# Patient Record
Sex: FEMALE | Race: WHITE | NOT HISPANIC OR LATINO | URBAN - METROPOLITAN AREA
[De-identification: names, ages, dates, MRNs, and addresses within clinical notes are randomized per-mention and may not be internally consistent; named-entity substitution may affect disease eponyms.]

---

## 2022-05-05 ENCOUNTER — OFFICE VISIT (OUTPATIENT)
Dept: URGENT CARE | Facility: CLINIC | Age: 62
End: 2022-05-05
Payer: COMMERCIAL

## 2022-05-05 VITALS
TEMPERATURE: 98 F | RESPIRATION RATE: 18 BRPM | SYSTOLIC BLOOD PRESSURE: 137 MMHG | BODY MASS INDEX: 35.08 KG/M2 | WEIGHT: 198 LBS | OXYGEN SATURATION: 100 % | HEIGHT: 63 IN | DIASTOLIC BLOOD PRESSURE: 80 MMHG | HEART RATE: 97 BPM

## 2022-05-05 DIAGNOSIS — M72.2 PLANTAR FASCIAL FIBROMATOSIS OF RIGHT FOOT: Primary | ICD-10-CM

## 2022-05-05 PROCEDURE — 3075F SYST BP GE 130 - 139MM HG: CPT | Mod: CPTII,,, | Performed by: FAMILY MEDICINE

## 2022-05-05 PROCEDURE — 96372 PR INJECTION,THERAP/PROPH/DIAG2ST, IM OR SUBCUT: ICD-10-PCS | Mod: ,,, | Performed by: FAMILY MEDICINE

## 2022-05-05 PROCEDURE — 99203 PR OFFICE/OUTPT VISIT, NEW, LEVL III, 30-44 MIN: ICD-10-PCS | Mod: 25,,, | Performed by: FAMILY MEDICINE

## 2022-05-05 PROCEDURE — 1160F RVW MEDS BY RX/DR IN RCRD: CPT | Mod: CPTII,,, | Performed by: FAMILY MEDICINE

## 2022-05-05 PROCEDURE — 3008F PR BODY MASS INDEX (BMI) DOCUMENTED: ICD-10-PCS | Mod: CPTII,,, | Performed by: FAMILY MEDICINE

## 2022-05-05 PROCEDURE — 3079F PR MOST RECENT DIASTOLIC BLOOD PRESSURE 80-89 MM HG: ICD-10-PCS | Mod: CPTII,,, | Performed by: FAMILY MEDICINE

## 2022-05-05 PROCEDURE — 1159F PR MEDICATION LIST DOCUMENTED IN MEDICAL RECORD: ICD-10-PCS | Mod: CPTII,,, | Performed by: FAMILY MEDICINE

## 2022-05-05 PROCEDURE — 99203 OFFICE O/P NEW LOW 30 MIN: CPT | Mod: 25,,, | Performed by: FAMILY MEDICINE

## 2022-05-05 PROCEDURE — 3008F BODY MASS INDEX DOCD: CPT | Mod: CPTII,,, | Performed by: FAMILY MEDICINE

## 2022-05-05 PROCEDURE — 3079F DIAST BP 80-89 MM HG: CPT | Mod: CPTII,,, | Performed by: FAMILY MEDICINE

## 2022-05-05 PROCEDURE — 3075F PR MOST RECENT SYSTOLIC BLOOD PRESS GE 130-139MM HG: ICD-10-PCS | Mod: CPTII,,, | Performed by: FAMILY MEDICINE

## 2022-05-05 PROCEDURE — 1160F PR REVIEW ALL MEDS BY PRESCRIBER/CLIN PHARMACIST DOCUMENTED: ICD-10-PCS | Mod: CPTII,,, | Performed by: FAMILY MEDICINE

## 2022-05-05 PROCEDURE — 1159F MED LIST DOCD IN RCRD: CPT | Mod: CPTII,,, | Performed by: FAMILY MEDICINE

## 2022-05-05 PROCEDURE — 96372 THER/PROPH/DIAG INJ SC/IM: CPT | Mod: ,,, | Performed by: FAMILY MEDICINE

## 2022-05-05 RX ORDER — DEXAMETHASONE SODIUM PHOSPHATE 100 MG/10ML
10 INJECTION INTRAMUSCULAR; INTRAVENOUS
Status: COMPLETED | OUTPATIENT
Start: 2022-05-05 | End: 2022-05-05

## 2022-05-05 RX ORDER — KETOROLAC TROMETHAMINE 30 MG/ML
30 INJECTION, SOLUTION INTRAMUSCULAR; INTRAVENOUS
Status: COMPLETED | OUTPATIENT
Start: 2022-05-05 | End: 2022-05-05

## 2022-05-05 RX ORDER — PREDNISONE 10 MG/1
30 TABLET ORAL DAILY
Qty: 15 TABLET | Refills: 0 | Status: SHIPPED | OUTPATIENT
Start: 2022-05-05 | End: 2022-05-10

## 2022-05-05 RX ORDER — DICLOFENAC SODIUM 50 MG/1
50 TABLET, DELAYED RELEASE ORAL 2 TIMES DAILY PRN
Qty: 14 TABLET | Refills: 0 | Status: SHIPPED | OUTPATIENT
Start: 2022-05-05 | End: 2022-05-12

## 2022-05-05 RX ORDER — MELATONIN 1 MG/ML
LIQUID (ML) ORAL NIGHTLY
COMMUNITY

## 2022-05-05 RX ADMIN — KETOROLAC TROMETHAMINE 30 MG: 30 INJECTION, SOLUTION INTRAMUSCULAR; INTRAVENOUS at 09:05

## 2022-05-05 RX ADMIN — DEXAMETHASONE SODIUM PHOSPHATE 10 MG: 100 INJECTION INTRAMUSCULAR; INTRAVENOUS at 09:05

## 2022-05-05 NOTE — PATIENT INSTRUCTIONS
continue to extend the affected area 3 to 4 times a day for 10-15 minutes using a frozen water bottle.  Continue stretches.  Medications have been sent to the pharmacy.  He will start all steroids tomorrow morning.  We will start oral anti-inflammatory this evening.  Take the medications with food.  Do not take any Advil or Aleve Motrin or ibuprofen with the medications.  Follow up with Orthopedic as needed

## 2022-05-05 NOTE — PROGRESS NOTES
"Subjective:       Patient ID: Carmen Bryan is a 61 y.o. female.    Vitals:  height is 5' 3" (1.6 m) and weight is 89.8 kg (198 lb). Her temperature is 98.1 °F (36.7 °C). Her blood pressure is 137/80 and her pulse is 97. Her respiration is 18 and oxygen saturation is 100%.     Chief Complaint: foot pain (Rt foot pain, she has had plantar fasciitis)    HP  Rt heel pain, 2 mos, uses ice, stretching, helps some,  she states she has long history of plantar fasciitis issues due to high arches.  Since she does have an orthopedic doctor.  Has done injections into her foot .  States she had in the left and now it is the right that is bothering her.  Denies any trauma injury or fall.                 Constitution: Negative.   HENT: Negative.    Cardiovascular: Negative.    Eyes: Negative.    Respiratory: Negative.    Gastrointestinal: Negative.    Genitourinary: Negative.    Musculoskeletal: Positive for pain.   Skin: Negative.    Allergic/Immunologic: Negative.    Hematologic/Lymphatic: Negative.        Objective:      Physical Exam   Constitutional: She is oriented to person, place, and time. She appears well-developed. She is cooperative.  Non-toxic appearance. She does not appear ill. No distress.   HENT:   Head: Normocephalic and atraumatic.   Ears:   Right Ear: Hearing and external ear normal.   Left Ear: Hearing and external ear normal.   Nose: Nose normal. No mucosal edema, rhinorrhea or nasal deformity. No epistaxis. Right sinus exhibits no maxillary sinus tenderness and no frontal sinus tenderness. Left sinus exhibits no maxillary sinus tenderness and no frontal sinus tenderness.   Mouth/Throat: Uvula is midline, oropharynx is clear and moist and mucous membranes are normal. No trismus in the jaw. Normal dentition. No uvula swelling. No oropharyngeal exudate, posterior oropharyngeal edema or posterior oropharyngeal erythema.   Eyes: Conjunctivae and lids are normal. No scleral icterus.   Neck: Trachea normal and " "phonation normal. Neck supple. No edema present. No erythema present. No neck rigidity present.   Pulmonary/Chest: Effort normal. No respiratory distress. She has no decreased breath sounds. She has no rhonchi.   Abdominal: Normal appearance.   Musculoskeletal: Normal range of motion.         General: Tenderness (Through out the arch of the right foot) present. No deformity. Normal range of motion.   Neurological: She is alert and oriented to person, place, and time. She exhibits normal muscle tone. Coordination normal.   Skin: Skin is warm, dry, intact, not diaphoretic and not pale.   Psychiatric: Her speech is normal and behavior is normal. Judgment and thought content normal.   Nursing note and vitals reviewed.             Previous History      Review of patient's allergies indicates:  No Known Allergies    History reviewed. No pertinent past medical history.  Current Outpatient Medications   Medication Instructions    diclofenac (VOLTAREN) 50 mg, Oral, 2 times daily PRN    melatonin oral solution Oral, Nightly    predniSONE (DELTASONE) 30 mg, Oral, Daily     Past Surgical History:   Procedure Laterality Date    CHOLECYSTECTOMY       Family History   Problem Relation Age of Onset    Hypertension Mother     Kidney disease Mother     Stroke Father     Cancer Father     Diabetes Father     Hypertension Father        Social History     Tobacco Use    Smoking status: Never Smoker    Smokeless tobacco: Never Used   Substance Use Topics    Alcohol use: Yes     Alcohol/week: 3.0 standard drinks     Types: 3 Cans of beer per week     Comment: occasionally        Physical Exam      Vital Signs Reviewed   /80   Pulse 97   Temp 98.1 °F (36.7 °C)   Resp 18   Ht 5' 3" (1.6 m)   Wt 89.8 kg (198 lb)   SpO2 100%   BMI 35.07 kg/m²        Procedures    Procedures     Labs   No results found for this or any previous visit.  Assessment:       1. Plantar fascial fibromatosis of right foot          Plan:     " continue to extend the affected area 3 to 4 times a day for 10-15 minutes using a frozen water bottle.  Continue stretches.  Medications have been sent to the pharmacy.  He will start all steroids tomorrow morning.  We will start oral anti-inflammatory this evening.  Take the medications with food.  Do not take any Advil or Aleve Motrin or ibuprofen with the medications.  Follow up with Orthopedic as needed    Plantar fascial fibromatosis of right foot    Other orders  -     ketorolac injection 30 mg  -     dexamethasone injection 10 mg  -     diclofenac (VOLTAREN) 50 MG EC tablet; Take 1 tablet (50 mg total) by mouth 2 (two) times daily as needed (pain).  Dispense: 14 tablet; Refill: 0  -     predniSONE (DELTASONE) 10 MG tablet; Take 3 tablets (30 mg total) by mouth once daily. for 5 days  Dispense: 15 tablet; Refill: 0

## 2022-05-05 NOTE — PROGRESS NOTES
"Subjective:       Patient ID: Carmen Bryan is a 61 y.o. female.    Vitals:  height is 5' 3" (1.6 m) and weight is 89.8 kg (198 lb). Her temperature is 98.1 °F (36.7 °C). Her blood pressure is 137/80 and her pulse is 97. Her respiration is 18 and oxygen saturation is 100%.     Chief Complaint: foot pain (Rt foot pain, she has had plantar fasciitis)    HPI  ROS    Objective:      Physical Exam      Assessment:       No diagnosis found.      Plan:         There are no diagnoses linked to this encounter.               "

## 2022-05-05 NOTE — PROGRESS NOTES
"Subjective:       Patient ID: Carmen Bryan is a 61 y.o. female.    Vitals:  height is 5' 3" (1.6 m) and weight is 89.8 kg (198 lb). Her temperature is 98.1 °F (36.7 °C). Her blood pressure is 137/80 and her pulse is 97. Her respiration is 18 and oxygen saturation is 100%.     Chief Complaint: foot pain (Rt foot pain, she has had plantar fasciitis)    Leg Pain       ROS    Objective:      Physical Exam      Assessment:       1. Plantar fascial fibromatosis of right foot          Plan:         Plantar fascial fibromatosis of right foot    Other orders  -     ketorolac injection 30 mg  -     dexamethasone injection 10 mg  -     diclofenac (VOLTAREN) 50 MG EC tablet; Take 1 tablet (50 mg total) by mouth 2 (two) times daily as needed (pain).  Dispense: 14 tablet; Refill: 0  -     predniSONE (DELTASONE) 10 MG tablet; Take 3 tablets (30 mg total) by mouth once daily. for 5 days  Dispense: 15 tablet; Refill: 0                   "

## 2022-07-08 ENCOUNTER — HOSPITAL ENCOUNTER (INPATIENT)
Facility: HOSPITAL | Age: 62
LOS: 4 days | Discharge: HOME | DRG: 378 | End: 2022-07-12
Attending: EMERGENCY MEDICINE | Admitting: INTERNAL MEDICINE
Payer: COMMERCIAL

## 2022-07-08 DIAGNOSIS — D64.9 SYMPTOMATIC ANEMIA: ICD-10-CM

## 2022-07-08 DIAGNOSIS — R07.9 CHEST PAIN: Primary | ICD-10-CM

## 2022-07-08 DIAGNOSIS — R06.09 EXERTIONAL DYSPNEA: ICD-10-CM

## 2022-07-08 DIAGNOSIS — K92.2 ACUTE GI BLEEDING: ICD-10-CM

## 2022-07-08 DIAGNOSIS — R07.9 ACUTE CHEST PAIN: ICD-10-CM

## 2022-07-08 DIAGNOSIS — D16.02 ENCHONDROMA OF HUMERUS, LEFT: ICD-10-CM

## 2022-07-08 DIAGNOSIS — R06.00 DYSPNEA, UNSPECIFIED TYPE: ICD-10-CM

## 2022-07-08 LAB
ABORH RETYPE: NORMAL
ALBUMIN SERPL-MCNC: 4.1 GM/DL (ref 3.5–5)
ALBUMIN/GLOB SERPL: 1.5 RATIO (ref 1.1–2)
ALP SERPL-CCNC: 69 UNIT/L (ref 40–150)
ALT SERPL-CCNC: 10 UNIT/L (ref 0–55)
AST SERPL-CCNC: 18 UNIT/L (ref 5–34)
BASOPHILS # BLD AUTO: 0.1 X10(3)/MCL (ref 0–0.2)
BASOPHILS NFR BLD AUTO: 0.8 %
BILIRUBIN DIRECT+TOT PNL SERPL-MCNC: 0.5 MG/DL
BNP BLD-MCNC: 61.4 PG/ML
BUN SERPL-MCNC: 13.9 MG/DL (ref 9.8–20.1)
CALCIUM SERPL-MCNC: 9.3 MG/DL (ref 8.4–10.2)
CHLORIDE SERPL-SCNC: 111 MMOL/L (ref 98–107)
CO2 SERPL-SCNC: 19 MMOL/L (ref 22–29)
CREAT SERPL-MCNC: 0.75 MG/DL (ref 0.55–1.02)
EOSINOPHIL # BLD AUTO: 0.13 X10(3)/MCL (ref 0–0.9)
EOSINOPHIL NFR BLD AUTO: 1 %
ERYTHROCYTE [DISTWIDTH] IN BLOOD BY AUTOMATED COUNT: 17.6 % (ref 11.5–17)
GLOBULIN SER-MCNC: 2.7 GM/DL (ref 2.4–3.5)
GLUCOSE SERPL-MCNC: 111 MG/DL (ref 74–100)
GROUP & RH: NORMAL
HCT VFR BLD AUTO: 24.2 % (ref 37–47)
HGB BLD-MCNC: 6.4 GM/DL (ref 12–16)
IMM GRANULOCYTES # BLD AUTO: 0.14 X10(3)/MCL (ref 0–0.04)
IMM GRANULOCYTES NFR BLD AUTO: 1.1 %
INDIRECT COOMBS GEL: NORMAL
LYMPHOCYTES # BLD AUTO: 1.66 X10(3)/MCL (ref 0.6–4.6)
LYMPHOCYTES NFR BLD AUTO: 13.4 %
MCH RBC QN AUTO: 17.9 PG (ref 27–31)
MCHC RBC AUTO-ENTMCNC: 26.4 MG/DL (ref 33–36)
MCV RBC AUTO: 67.8 FL (ref 80–94)
MONOCYTES # BLD AUTO: 1.4 X10(3)/MCL (ref 0.1–1.3)
MONOCYTES NFR BLD AUTO: 11.3 %
NEUTROPHILS # BLD AUTO: 9 X10(3)/MCL (ref 2.1–9.2)
NEUTROPHILS NFR BLD AUTO: 72.4 %
NRBC BLD AUTO-RTO: 0.6 %
PLATELET # BLD AUTO: 430 X10(3)/MCL (ref 130–400)
PMV BLD AUTO: 9.6 FL (ref 7.4–10.4)
POTASSIUM SERPL-SCNC: 4.3 MMOL/L (ref 3.5–5.1)
PROT SERPL-MCNC: 6.8 GM/DL (ref 6.4–8.3)
RBC # BLD AUTO: 3.57 X10(6)/MCL (ref 4.2–5.4)
SODIUM SERPL-SCNC: 139 MMOL/L (ref 136–145)
TROPONIN I SERPL-MCNC: <0.01 NG/ML (ref 0–0.04)
WBC # SPEC AUTO: 12.4 X10(3)/MCL (ref 4.5–11.5)

## 2022-07-08 PROCEDURE — 84484 ASSAY OF TROPONIN QUANT: CPT | Performed by: NURSE PRACTITIONER

## 2022-07-08 PROCEDURE — 96376 TX/PRO/DX INJ SAME DRUG ADON: CPT

## 2022-07-08 PROCEDURE — 99291 CRITICAL CARE FIRST HOUR: CPT | Mod: 25

## 2022-07-08 PROCEDURE — 36415 COLL VENOUS BLD VENIPUNCTURE: CPT | Performed by: EMERGENCY MEDICINE

## 2022-07-08 PROCEDURE — 85025 COMPLETE CBC W/AUTO DIFF WBC: CPT | Performed by: NURSE PRACTITIONER

## 2022-07-08 PROCEDURE — 93010 ELECTROCARDIOGRAM REPORT: CPT | Mod: ,,, | Performed by: INTERNAL MEDICINE

## 2022-07-08 PROCEDURE — 86920 COMPATIBILITY TEST SPIN: CPT | Performed by: EMERGENCY MEDICINE

## 2022-07-08 PROCEDURE — P9016 RBC LEUKOCYTES REDUCED: HCPCS | Performed by: EMERGENCY MEDICINE

## 2022-07-08 PROCEDURE — 86920 COMPATIBILITY TEST SPIN: CPT | Performed by: INTERNAL MEDICINE

## 2022-07-08 PROCEDURE — C9113 INJ PANTOPRAZOLE SODIUM, VIA: HCPCS | Performed by: EMERGENCY MEDICINE

## 2022-07-08 PROCEDURE — 83880 ASSAY OF NATRIURETIC PEPTIDE: CPT | Performed by: NURSE PRACTITIONER

## 2022-07-08 PROCEDURE — 36415 COLL VENOUS BLD VENIPUNCTURE: CPT | Performed by: NURSE PRACTITIONER

## 2022-07-08 PROCEDURE — 36430 TRANSFUSION BLD/BLD COMPNT: CPT

## 2022-07-08 PROCEDURE — 86850 RBC ANTIBODY SCREEN: CPT | Performed by: EMERGENCY MEDICINE

## 2022-07-08 PROCEDURE — 80053 COMPREHEN METABOLIC PANEL: CPT | Performed by: NURSE PRACTITIONER

## 2022-07-08 PROCEDURE — 93010 EKG 12-LEAD: ICD-10-PCS | Mod: ,,, | Performed by: INTERNAL MEDICINE

## 2022-07-08 PROCEDURE — C9113 INJ PANTOPRAZOLE SODIUM, VIA: HCPCS | Performed by: NURSE PRACTITIONER

## 2022-07-08 PROCEDURE — 93005 ELECTROCARDIOGRAM TRACING: CPT

## 2022-07-08 PROCEDURE — 25000003 PHARM REV CODE 250: Performed by: NURSE PRACTITIONER

## 2022-07-08 PROCEDURE — 11000001 HC ACUTE MED/SURG PRIVATE ROOM

## 2022-07-08 PROCEDURE — 96374 THER/PROPH/DIAG INJ IV PUSH: CPT

## 2022-07-08 PROCEDURE — 63600175 PHARM REV CODE 636 W HCPCS: Performed by: EMERGENCY MEDICINE

## 2022-07-08 PROCEDURE — 63600175 PHARM REV CODE 636 W HCPCS: Performed by: NURSE PRACTITIONER

## 2022-07-08 RX ORDER — PANTOPRAZOLE SODIUM 40 MG/10ML
80 INJECTION, POWDER, LYOPHILIZED, FOR SOLUTION INTRAVENOUS
Status: COMPLETED | OUTPATIENT
Start: 2022-07-08 | End: 2022-07-08

## 2022-07-08 RX ORDER — NALOXONE HCL 0.4 MG/ML
0.02 VIAL (ML) INJECTION
Status: DISCONTINUED | OUTPATIENT
Start: 2022-07-08 | End: 2022-07-12 | Stop reason: HOSPADM

## 2022-07-08 RX ORDER — HYDROCODONE BITARTRATE AND ACETAMINOPHEN 500; 5 MG/1; MG/1
TABLET ORAL
Status: DISCONTINUED | OUTPATIENT
Start: 2022-07-08 | End: 2022-07-12 | Stop reason: HOSPADM

## 2022-07-08 RX ORDER — SODIUM CHLORIDE 9 MG/ML
INJECTION, SOLUTION INTRAVENOUS CONTINUOUS
Status: DISCONTINUED | OUTPATIENT
Start: 2022-07-08 | End: 2022-07-12

## 2022-07-08 RX ORDER — SODIUM CHLORIDE 0.9 % (FLUSH) 0.9 %
10 SYRINGE (ML) INJECTION EVERY 12 HOURS PRN
Status: DISCONTINUED | OUTPATIENT
Start: 2022-07-08 | End: 2022-07-12 | Stop reason: HOSPADM

## 2022-07-08 RX ADMIN — SODIUM CHLORIDE: 9 INJECTION, SOLUTION INTRAVENOUS at 11:07

## 2022-07-08 RX ADMIN — PANTOPRAZOLE SODIUM 8 MG/HR: 40 INJECTION, POWDER, FOR SOLUTION INTRAVENOUS at 11:07

## 2022-07-08 RX ADMIN — PANTOPRAZOLE SODIUM 80 MG: 40 INJECTION, POWDER, FOR SOLUTION INTRAVENOUS at 06:07

## 2022-07-08 NOTE — ED PROVIDER NOTES
Encounter Date: 7/8/2022       History     Chief Complaint   Patient presents with    Chest Pain    Shortness of Breath     Pt presents c/o Chest tightness with SOB. Onset today.  Denies cardiac hx.  States increased daily stress.  Neg smoker.      Patient presents to the ED chest tightness with ambulation.   .No distress noted in triage.         Review of patient's allergies indicates:  No Known Allergies  No past medical history on file.  Past Surgical History:   Procedure Laterality Date    CHOLECYSTECTOMY       Family History   Problem Relation Age of Onset    Hypertension Mother     Kidney disease Mother     Stroke Father     Cancer Father     Diabetes Father     Hypertension Father      Social History     Tobacco Use    Smoking status: Never Smoker    Smokeless tobacco: Never Used   Substance Use Topics    Alcohol use: Yes     Alcohol/week: 3.0 standard drinks     Types: 3 Cans of beer per week     Comment: occasionally     Review of Systems   Constitutional: Negative.    Respiratory: Positive for chest tightness.    All other systems reviewed and are negative.      Physical Exam     Initial Vitals [07/08/22 1051]   BP Pulse Resp Temp SpO2   (!) 143/80 87 16 98.6 °F (37 °C) 98 %      MAP       --         Physical Exam    Constitutional: She appears well-developed.   HENT:   Head: Normocephalic.           ED Course   Procedures  Labs Reviewed - No data to display       Imaging Results    None          Medications - No data to display                                 Candace Abdul, [facility]  07/08/22 8431

## 2022-07-09 LAB
ABO + RH BLD: NORMAL
ABO + RH BLD: NORMAL
ALBUMIN SERPL-MCNC: 3.6 GM/DL (ref 3.5–5)
ALBUMIN/GLOB SERPL: 1.6 RATIO (ref 1.1–2)
ALP SERPL-CCNC: 64 UNIT/L (ref 40–150)
ALT SERPL-CCNC: 9 UNIT/L (ref 0–55)
AST SERPL-CCNC: 15 UNIT/L (ref 5–34)
BASOPHILS # BLD AUTO: 0.08 X10(3)/MCL (ref 0–0.2)
BASOPHILS NFR BLD AUTO: 0.8 %
BILIRUBIN DIRECT+TOT PNL SERPL-MCNC: 1 MG/DL
BLD PROD TYP BPU: NORMAL
BLD PROD TYP BPU: NORMAL
BLOOD UNIT EXPIRATION DATE: NORMAL
BLOOD UNIT EXPIRATION DATE: NORMAL
BLOOD UNIT TYPE CODE: 5100
BLOOD UNIT TYPE CODE: 5100
BUN SERPL-MCNC: 9.9 MG/DL (ref 9.8–20.1)
CALCIUM SERPL-MCNC: 8.8 MG/DL (ref 8.4–10.2)
CHLORIDE SERPL-SCNC: 113 MMOL/L (ref 98–107)
CO2 SERPL-SCNC: 19 MMOL/L (ref 22–29)
CREAT SERPL-MCNC: 0.71 MG/DL (ref 0.55–1.02)
CROSSMATCH INTERPRETATION: NORMAL
CROSSMATCH INTERPRETATION: NORMAL
DISPENSE STATUS: NORMAL
DISPENSE STATUS: NORMAL
EOSINOPHIL # BLD AUTO: 0.08 X10(3)/MCL (ref 0–0.9)
EOSINOPHIL NFR BLD AUTO: 0.8 %
ERYTHROCYTE [DISTWIDTH] IN BLOOD BY AUTOMATED COUNT: 21.5 % (ref 11.5–17)
GLOBULIN SER-MCNC: 2.3 GM/DL (ref 2.4–3.5)
GLUCOSE SERPL-MCNC: 101 MG/DL (ref 74–100)
HCT VFR BLD AUTO: 30 % (ref 37–47)
HGB BLD-MCNC: 8.5 GM/DL (ref 12–16)
IMM GRANULOCYTES # BLD AUTO: 0.13 X10(3)/MCL (ref 0–0.04)
IMM GRANULOCYTES NFR BLD AUTO: 1.3 %
LYMPHOCYTES # BLD AUTO: 0.8 X10(3)/MCL (ref 0.6–4.6)
LYMPHOCYTES NFR BLD AUTO: 7.8 %
MCH RBC QN AUTO: 21 PG (ref 27–31)
MCHC RBC AUTO-ENTMCNC: 28.3 MG/DL (ref 33–36)
MCV RBC AUTO: 74.1 FL (ref 80–94)
MONOCYTES # BLD AUTO: 1.31 X10(3)/MCL (ref 0.1–1.3)
MONOCYTES NFR BLD AUTO: 12.8 %
NEUTROPHILS # BLD AUTO: 7.8 X10(3)/MCL (ref 2.1–9.2)
NEUTROPHILS NFR BLD AUTO: 76.5 %
NRBC BLD AUTO-RTO: 0.4 %
PLATELET # BLD AUTO: 306 X10(3)/MCL (ref 130–400)
PMV BLD AUTO: 9.4 FL (ref 7.4–10.4)
POTASSIUM SERPL-SCNC: 4.3 MMOL/L (ref 3.5–5.1)
PROT SERPL-MCNC: 5.9 GM/DL (ref 6.4–8.3)
RBC # BLD AUTO: 4.05 X10(6)/MCL (ref 4.2–5.4)
SODIUM SERPL-SCNC: 141 MMOL/L (ref 136–145)
UNIT NUMBER: NORMAL
UNIT NUMBER: NORMAL
WBC # SPEC AUTO: 10.2 X10(3)/MCL (ref 4.5–11.5)

## 2022-07-09 PROCEDURE — 25000003 PHARM REV CODE 250: Performed by: NURSE PRACTITIONER

## 2022-07-09 PROCEDURE — 80053 COMPREHEN METABOLIC PANEL: CPT | Performed by: NURSE PRACTITIONER

## 2022-07-09 PROCEDURE — 85025 COMPLETE CBC W/AUTO DIFF WBC: CPT | Performed by: NURSE PRACTITIONER

## 2022-07-09 PROCEDURE — C9113 INJ PANTOPRAZOLE SODIUM, VIA: HCPCS | Performed by: NURSE PRACTITIONER

## 2022-07-09 PROCEDURE — 63600175 PHARM REV CODE 636 W HCPCS: Performed by: NURSE PRACTITIONER

## 2022-07-09 PROCEDURE — 36415 COLL VENOUS BLD VENIPUNCTURE: CPT | Performed by: NURSE PRACTITIONER

## 2022-07-09 PROCEDURE — 11000001 HC ACUTE MED/SURG PRIVATE ROOM

## 2022-07-09 PROCEDURE — 25000003 PHARM REV CODE 250: Performed by: INTERNAL MEDICINE

## 2022-07-09 PROCEDURE — P9016 RBC LEUKOCYTES REDUCED: HCPCS | Performed by: EMERGENCY MEDICINE

## 2022-07-09 RX ORDER — DIPHENHYDRAMINE HCL 25 MG
25 CAPSULE ORAL EVERY 6 HOURS PRN
Status: DISCONTINUED | OUTPATIENT
Start: 2022-07-09 | End: 2022-07-12 | Stop reason: HOSPADM

## 2022-07-09 RX ORDER — ACETAMINOPHEN 325 MG/1
650 TABLET ORAL EVERY 6 HOURS PRN
Status: DISCONTINUED | OUTPATIENT
Start: 2022-07-09 | End: 2022-07-12 | Stop reason: HOSPADM

## 2022-07-09 RX ORDER — ONDANSETRON 2 MG/ML
4 INJECTION INTRAMUSCULAR; INTRAVENOUS EVERY 4 HOURS PRN
Status: DISCONTINUED | OUTPATIENT
Start: 2022-07-09 | End: 2022-07-12 | Stop reason: HOSPADM

## 2022-07-09 RX ORDER — TALC
3 POWDER (GRAM) TOPICAL NIGHTLY PRN
Status: DISCONTINUED | OUTPATIENT
Start: 2022-07-09 | End: 2022-07-12 | Stop reason: HOSPADM

## 2022-07-09 RX ORDER — CETIRIZINE HYDROCHLORIDE 10 MG/1
10 TABLET ORAL DAILY
Status: DISCONTINUED | OUTPATIENT
Start: 2022-07-09 | End: 2022-07-12 | Stop reason: HOSPADM

## 2022-07-09 RX ADMIN — CETIRIZINE HYDROCHLORIDE 10 MG: 10 TABLET, FILM COATED ORAL at 11:07

## 2022-07-09 RX ADMIN — SODIUM CHLORIDE: 9 INJECTION, SOLUTION INTRAVENOUS at 11:07

## 2022-07-09 RX ADMIN — PANTOPRAZOLE SODIUM 8 MG/HR: 40 INJECTION, POWDER, FOR SOLUTION INTRAVENOUS at 07:07

## 2022-07-09 RX ADMIN — PANTOPRAZOLE SODIUM 8 MG/HR: 40 INJECTION, POWDER, FOR SOLUTION INTRAVENOUS at 11:07

## 2022-07-09 NOTE — CONSULTS
Gastroenterology Consultation Note    Reason for Consult:  The primary encounter diagnosis was Chest pain. Diagnoses of Enchondroma of humerus, left, Acute chest pain, Dyspnea, unspecified type, Symptomatic anemia, and Acute GI bleeding were also pertinent to this visit.    PCP:   Leonardo Trevizo MD    Referring MD:  Aaareferral Self  No address on file    Hospital Day: 1     Initial History of Present Illness (HPI):  This is a 61 y.o. female current knee with symptomatic anemia, question GI bleeding/melena.    She reports being under a moderate amount of stress with her  currently admitted hospital for removal of meningioma last week.  She also admits to moderate NSAID use and reports intermittent episodes of dark stool.  She was seen in the emergency room yesterday where hemoglobin was 6.4 MCV 67, transfused 2 units of packed red blood cells with hemoglobin rise to 8.5.    This morning, in no acute distress at bedside without further GI bleeding.    ROS:  Review of Systems   Constitutional: Positive for malaise/fatigue. Negative for fever and weight loss.   Respiratory: Negative for cough and shortness of breath.    Cardiovascular: Negative for chest pain, palpitations and leg swelling.   Gastrointestinal: Positive for blood in stool and melena. Negative for abdominal pain, constipation, diarrhea, heartburn, nausea and vomiting.   Musculoskeletal: Negative for back pain and myalgias.   Skin: Negative for rash.   Neurological: Negative for speech change and focal weakness.   All other systems reviewed and are negative.      Medical History:   No past medical history on file.    Surgical History:   Past Surgical History:   Procedure Laterality Date    CHOLECYSTECTOMY         Family History:   Family History   Problem Relation Age of Onset    Hypertension Mother     Kidney disease Mother     Stroke Father     Cancer Father     Diabetes Father     Hypertension Father    .     Social History:   Social  History     Tobacco Use    Smoking status: Never Smoker    Smokeless tobacco: Never Used   Substance Use Topics    Alcohol use: Yes     Alcohol/week: 3.0 standard drinks     Types: 3 Cans of beer per week     Comment: occasionally       Allergies:  Review of patient's allergies indicates:  No Known Allergies    Medications Prior to Admission   Medication Sig Dispense Refill Last Dose    melatonin oral solution Take by mouth every evening.            Objective Findings:    Vital Signs:  /85   Pulse 92   Temp 98.6 °F (37 °C) (Oral)   Resp 17   Wt 93.3 kg (205 lb 11 oz)   SpO2 (!) 94%   BMI 36.44 kg/m²   Body mass index is 36.44 kg/m².    Physical Exam:  Physical Exam  Cardiovascular:      Rate and Rhythm: Normal rate and regular rhythm.   Pulmonary:      Effort: Pulmonary effort is normal.      Breath sounds: Normal breath sounds.   Abdominal:      General: Abdomen is flat. Bowel sounds are normal.      Palpations: Abdomen is soft.   Neurological:      Mental Status: She is alert.         Labs:  Recent Results (from the past 48 hour(s))   CBC with Differential    Collection Time: 07/08/22  1:53 PM   Result Value Ref Range    WBC 12.4 (H) 4.5 - 11.5 x10(3)/mcL    RBC 3.57 (L) 4.20 - 5.40 x10(6)/mcL    Hgb 6.4 (L) 12.0 - 16.0 gm/dL    Hct 24.2 (L) 37.0 - 47.0 %    MCV 67.8 (L) 80.0 - 94.0 fL    MCH 17.9 (L) 27.0 - 31.0 pg    MCHC 26.4 (L) 33.0 - 36.0 mg/dL    RDW 17.6 (H) 11.5 - 17.0 %    Platelet 430 (H) 130 - 400 x10(3)/mcL    MPV 9.6 7.4 - 10.4 fL    Neut % 72.4 %    Lymph % 13.4 %    Mono % 11.3 %    Eos % 1.0 %    Basophil % 0.8 %    Lymph # 1.66 0.6 - 4.6 x10(3)/mcL    Neut # 9.0 2.1 - 9.2 x10(3)/mcL    Mono # 1.40 (H) 0.1 - 1.3 x10(3)/mcL    Eos # 0.13 0 - 0.9 x10(3)/mcL    Baso # 0.10 0 - 0.2 x10(3)/mcL    IG# 0.14 (H) 0 - 0.04 x10(3)/mcL    IG% 1.1 %    NRBC% 0.6 %   Comprehensive Metabolic Panel    Collection Time: 07/08/22  1:53 PM   Result Value Ref Range    Sodium Level 139 136 - 145 mmol/L     Potassium Level 4.3 3.5 - 5.1 mmol/L    Chloride 111 (H) 98 - 107 mmol/L    Carbon Dioxide 19 (L) 22 - 29 mmol/L    Glucose Level 111 (H) 74 - 100 mg/dL    Blood Urea Nitrogen 13.9 9.8 - 20.1 mg/dL    Creatinine 0.75 0.55 - 1.02 mg/dL    Calcium Level Total 9.3 8.4 - 10.2 mg/dL    Protein Total 6.8 6.4 - 8.3 gm/dL    Albumin Level 4.1 3.5 - 5.0 gm/dL    Globulin 2.7 2.4 - 3.5 gm/dL    Albumin/Globulin Ratio 1.5 1.1 - 2.0 ratio    Bilirubin Total 0.5 <=1.5 mg/dL    Alkaline Phosphatase 69 40 - 150 unit/L    Alanine Aminotransferase 10 0 - 55 unit/L    Aspartate Aminotransferase 18 5 - 34 unit/L    Estimated GFR-Non  >60 mls/min/1.73/m2   Troponin I    Collection Time: 07/08/22  1:53 PM   Result Value Ref Range    Troponin-I <0.010 0.000 - 0.045 ng/mL   BNP    Collection Time: 07/08/22  1:53 PM   Result Value Ref Range    Natriuretic Peptide 61.4 <=100.0 pg/mL   Prepare RBC 2 Units; anemia    Collection Time: 07/08/22  6:41 PM   Result Value Ref Range    UNIT NUMBER [id number]     UNIT ABO/RH O POS     DISPENSE STATUS Transfused     Unit Expiration [id number]     Product Code L2871H42     Unit Blood Type Code 5100     CROSSMATCH INTERPRETATION Compatible     UNIT NUMBER [id number]     UNIT ABO/RH O POS     DISPENSE STATUS Issued     Unit Expiration [id number]     Product Code D4820G68     Unit Blood Type Code 5100     CROSSMATCH INTERPRETATION Compatible    Type & Screen    Collection Time: 07/08/22  6:41 PM   Result Value Ref Range    Group & Rh O POS     Indirect Anita GEL NEG    ABORH RETYPE    Collection Time: 07/08/22  8:23 PM   Result Value Ref Range    ABORH Retype O POS    Comprehensive Metabolic Panel (CMP)    Collection Time: 07/09/22 10:26 AM   Result Value Ref Range    Sodium Level 141 136 - 145 mmol/L    Potassium Level 4.3 3.5 - 5.1 mmol/L    Chloride 113 (H) 98 - 107 mmol/L    Carbon Dioxide 19 (L) 22 - 29 mmol/L    Glucose Level 101 (H) 74 - 100 mg/dL    Blood Urea  Nitrogen 9.9 9.8 - 20.1 mg/dL    Creatinine 0.71 0.55 - 1.02 mg/dL    Calcium Level Total 8.8 8.4 - 10.2 mg/dL    Protein Total 5.9 (L) 6.4 - 8.3 gm/dL    Albumin Level 3.6 3.5 - 5.0 gm/dL    Globulin 2.3 (L) 2.4 - 3.5 gm/dL    Albumin/Globulin Ratio 1.6 1.1 - 2.0 ratio    Bilirubin Total 1.0 <=1.5 mg/dL    Alkaline Phosphatase 64 40 - 150 unit/L    Alanine Aminotransferase 9 0 - 55 unit/L    Aspartate Aminotransferase 15 5 - 34 unit/L    Estimated GFR-Non  >60 mls/min/1.73/m2   CBC with Differential    Collection Time: 07/09/22 10:26 AM   Result Value Ref Range    WBC 10.2 4.5 - 11.5 x10(3)/mcL    RBC 4.05 (L) 4.20 - 5.40 x10(6)/mcL    Hgb 8.5 (L) 12.0 - 16.0 gm/dL    Hct 30.0 (L) 37.0 - 47.0 %    MCV 74.1 (L) 80.0 - 94.0 fL    MCH 21.0 (L) 27.0 - 31.0 pg    MCHC 28.3 (L) 33.0 - 36.0 mg/dL    RDW 21.5 (H) 11.5 - 17.0 %    Platelet 306 130 - 400 x10(3)/mcL    MPV 9.4 7.4 - 10.4 fL    Neut % 76.5 %    Lymph % 7.8 %    Mono % 12.8 %    Eos % 0.8 %    Basophil % 0.8 %    Lymph # 0.80 0.6 - 4.6 x10(3)/mcL    Neut # 7.8 2.1 - 9.2 x10(3)/mcL    Mono # 1.31 (H) 0.1 - 1.3 x10(3)/mcL    Eos # 0.08 0 - 0.9 x10(3)/mcL    Baso # 0.08 0 - 0.2 x10(3)/mcL    IG# 0.13 (H) 0 - 0.04 x10(3)/mcL    IG% 1.3 %    NRBC% 0.4 %       X-Ray Chest 1 View   Final Result         1. No convincing acute radiographic abnormality.   2. Incidental large hiatal hernia.   3. Incidental right humeral head finding is of typical radiographic appearance for enchondroma.  Correlation with evidence of pain localization recommended; repeat dedicated shoulder radiographs could be obtained in 2-3 months in order to ensure ongoing stability, given absence of comparison imaging.         Electronically signed by: Tru Real   Date:    07/08/2022   Time:    14:15          Imaging:    Endoscopy:        Assessment/Plan:  Acute anemia, melena/overt GI bleeding, NSAID use.  Suspect upper GI bleeding  , stable at this point.    Plan  Continue proton  pump inhibitor infusion  Regular diet today  Trend hb q8hrs x 3  NPO after midnight  Plan for EGD on Monday unless needed earlier    Thank you for allowing us to participate in the care of Carmen Bryan.    Royce Keith MD

## 2022-07-09 NOTE — PROGRESS NOTES
[facility] Hospital Medicine Progress Note        Chief Complaint: Inpatient Follow-up for GIB    HPI:   Ms. Bryan is a 61 year old female who presented to the ED with c/o SOB, worse with exertion x 2 approximately 2 weeks.  She denies any chest pain, or fever. Denies any cardiac medical history.  She does state recent intermittent epigastric abdominal discomfort over the past several weeks that she attributed to recent stress.  She denies any black or bloody stool.  Emesis once yesterday, states was dark, but not coffee-ground.  Denies any anticoagulant use.  Today's ED lab work was notable for H&H 6.4/24.2, all other indices unremarkable.  CXR showed incidental hiatal hernia, incidental right humeral head enchondroma.  EKG showed SR with short WA. EKG, BNP negative. VSS on RA. She was admitted to hospital medicine for management. 2 units WBC was transfused, Protonix GGT was added and GI was consulted.    Interval Hx:   Afebrile and hemodynamically stable overnight.  When seen and examined at bedside she was alert, comfortable resting in the bed.  Denies abdominal discomfort.  Reports feeling better except for nasal congestion.  No labs were available for this morning.    Objective/physical exam:  Vitals:    07/08/22 2306 07/08/22 2357 07/09/22 0150 07/09/22 0632   BP: 113/74 (!) 140/82 (!) 161/82 (!) 161/82   Pulse: 94 90 106 106   Resp: 17 18  18   Temp:  99.3 °F (37.4 °C) 98.2 °F (36.8 °C) 98.2 °F (36.8 °C)   TempSrc:  Oral Oral    SpO2: 97% 96%  96%   Weight:    93.3 kg (205 lb 11 oz)     General: In no acute distress, afebrile  Respiratory: Clear to auscultation bilaterally  Cardiovascular: S1, S2, no appreciable murmur  Abdomen: Soft, nontender, BS +  MSK: Warm, no lower extremity edema, no clubbing or cyanosis  Neurologic: Alert and oriented x4, moving all extremities with good strength     Lab Results   Component Value Date     07/08/2022    K 4.3 07/08/2022    CO2 19  (L) 07/08/2022    BUN 13.9 07/08/2022    CREATININE 0.75 07/08/2022    CALCIUM 9.3 07/08/2022    EGFRNONAA >60 07/08/2022      Lab Results   Component Value Date    ALT 10 07/08/2022    AST 18 07/08/2022    ALKPHOS 69 07/08/2022    BILITOT 0.5 07/08/2022      Lab Results   Component Value Date    WBC 12.4 (H) 07/08/2022    HGB 6.4 (L) 07/08/2022    HCT 24.2 (L) 07/08/2022    MCV 67.8 (L) 07/08/2022     (H) 07/08/2022           Medications:     sodium chloride, acetaminophen, diphenhydrAMINE, melatonin, naloxone, ondansetron, sodium chloride 0.9%     Assessment/Plan:    Acute symptomatic anemia due to blood loss  Acute GI bleed  Daily NSAID use  Chronic seasonal allergies    Plan:  - GI consulted.  Continue Protonix.  Monitor hemoglobin. Avoid NSAIDS  - We will add Zyrtec.  Other medications were reviewed    [facility]  Labs for tomorrow    Critical care diagnosis: acute GI bleed needing protonix drip  Critical care time: 50 minutes        Margarito Hook MD

## 2022-07-09 NOTE — ED PROVIDER NOTES
Encounter Date: 7/8/2022       History     Chief Complaint   Patient presents with    Chest Pain    Shortness of Breath     Pt presents c/o Chest tightness with SOB. Onset today.  Denies cardiac hx.  States increased daily stress.  Neg smoker.      61-year-old female presents to the emergency department with shortness of breath.  States despite triage note that she is not actually having any chest pain or chest tightness Hinduism feels that she cannot catch her breath.  This has been worsening over the past few weeks.  She has had some intermittent epigastric abdominal pain for the described but kind of burning.  States she has been under a lot of stress lately because her  just had brain surgery for a meningioma.  Shortness of breath worsens with ambulation.  She has not noticed any blood in her stool.  States she did vomit once yesterday and it was dark like a chocolate bar. No blood thinners      Shortness of Breath  This is a new problem. The average episode lasts 2 weeks. The current episode started more than 1 week ago. The problem has been gradually worsening. Associated symptoms include abdominal pain. Pertinent negatives include no fever, no neck pain, no cough, no chest pain and no vomiting.     Review of patient's allergies indicates:  No Known Allergies  No past medical history on file.  Past Surgical History:   Procedure Laterality Date    CHOLECYSTECTOMY       Family History   Problem Relation Age of Onset    Hypertension Mother     Kidney disease Mother     Stroke Father     Cancer Father     Diabetes Father     Hypertension Father      Social History     Tobacco Use    Smoking status: Never Smoker    Smokeless tobacco: Never Used   Substance Use Topics    Alcohol use: Yes     Alcohol/week: 3.0 standard drinks     Types: 3 Cans of beer per week     Comment: occasionally     Review of Systems   Constitutional: Negative.  Negative for chills and fever.   Eyes: Negative.     Respiratory: Positive for shortness of breath. Negative for cough and chest tightness.    Cardiovascular: Negative for chest pain.   Gastrointestinal: Positive for abdominal pain. Negative for nausea and vomiting.   Musculoskeletal: Negative for myalgias and neck pain.   Neurological: Negative for syncope.   All other systems reviewed and are negative.      Physical Exam     Initial Vitals [07/08/22 1051]   BP Pulse Resp Temp SpO2   (!) 143/80 87 16 98.6 °F (37 °C) 98 %      MAP       --         Physical Exam    Nursing note and vitals reviewed.  Constitutional: She appears well-developed and well-nourished. No distress.   HENT:   Head: Normocephalic and atraumatic.   Eyes: Conjunctivae are normal.   Cardiovascular: Normal rate and intact distal pulses.   Pulmonary/Chest: No respiratory distress. She has no rhonchi.   Abdominal: Abdomen is soft. Bowel sounds are normal. There is no abdominal tenderness.   Black stool occult positive There is no rebound and no guarding.   Musculoskeletal:         General: No edema.     Neurological: She is alert. She has normal strength.   Skin: Skin is warm and dry.   Psychiatric: She has a normal mood and affect.         ED Course   Critical Care    Date/Time: 7/8/2022 7:21 PM  Performed by: Leander Baez MD  Authorized by: Leander Baez MD   Total critical care time (exclusive of procedural time) : 30 minutes  Critical care was necessary to treat or prevent imminent or life-threatening deterioration of the following conditions: dehydration (gi bleeding).        Labs Reviewed   CBC WITH DIFFERENTIAL - Abnormal; Notable for the following components:       Result Value    WBC 12.4 (*)     RBC 3.57 (*)     Hgb 6.4 (*)     Hct 24.2 (*)     MCV 67.8 (*)     MCH 17.9 (*)     MCHC 26.4 (*)     RDW 17.6 (*)     Platelet 430 (*)     Mono # 1.40 (*)     IG# 0.14 (*)     All other components within normal limits   COMPREHENSIVE METABOLIC PANEL - Abnormal; Notable for the following  components:    Chloride 111 (*)     Carbon Dioxide 19 (*)     Glucose Level 111 (*)     All other components within normal limits   TROPONIN I - Normal   B-TYPE NATRIURETIC PEPTIDE - Normal   TYPE & SCREEN   ABORH RETYPE   PREPARE RBC SOFT        ECG Results          EKG 12-lead (Final result)  Result time 07/08/22 18:20:27    ED Interpretation by Leander Baez MD (07/08/22 18:20:27, [facility]gency Dept, Emergency Medicine)    10:51 a.m..  84 beats per minute.  Normal sinus rhythm.  No ST elevation or depression.                  Final result by Interface, Lab In [facility] (07/08/22 16:55:53)                 Narrative:    Test Reason : R07.9,    Vent. Rate : 084 BPM     Atrial Rate : 084 BPM     P-R Int : 104 ms          QRS Dur : 074 ms      QT Int : 370 ms       P-R-T Axes : 012 035 023 degrees     QTc Int : 437 ms    Sinus rhythm with short CA  Otherwise normal ECG    Confirmed by Sulma Mccollum MD ([id number]) on 7/8/2022 4:55:42 PM    Referred By: [email or web address]   SELF           Confirmed By:Sulma Mccollum MD                            Imaging Results          X-Ray Chest 1 View (Final result)  Result time 07/08/22 14:15:50    Final result by Tru Real MD (07/08/22 14:15:50)                 Impression:        1. No convincing acute radiographic abnormality.  2. Incidental large hiatal hernia.  3. Incidental right humeral head finding is of typical radiographic appearance for enchondroma.  Correlation with evidence of pain localization recommended; repeat dedicated shoulder radiographs could be obtained in 2-3 months in order to ensure ongoing stability, given absence of comparison imaging.      Electronically signed by: Tru Real  Date:    07/08/2022  Time:    14:15             Narrative:    EXAMINATION:  XR CHEST 1 VIEW    CLINICAL HISTORY:  ; chest pain;    TECHNIQUE:  Single view (AP) of the chest.    COMPARISON:  None available at the time of initial  interpretation.    FINDINGS:  Lines/tubes/devices: none present    The cardiomediastinal silhouette and central pulmonary vasculature are unremarkable for utilized technique.  The trachea is midline. There is no lobar consolidation, pleural effusion, or pneumothorax.    No acute osseous displacement is identified.  Sclerotic irregularity at the proximal left humerus is nonspecific, with overall appearance suggestive of enchondroma.  The visualized extra thoracic soft tissues and included upper abdomen are without acute abnormality.  Large circumscribed opacity with associated air-fluid level projects over the lower mediastinal midline, consistent with hiatal hernia.                                   Medications   0.9%  NaCl infusion (for blood administration) (has no administration in time range)   pantoprazole injection 80 mg (80 mg Intravenous Given 7/8/22 1845)     Medical Decision Making:   Hemoglobin 6. Sounds like an episode of hematemesis yesterday .  Occult positive stool melanotic grossly.  Order for 2 units to transfuse blood.  Admit.  Has never seen a gastroenterologist.  Protonix started                  Hospitalist paged for admission    Clinical Impression:   Final diagnoses:  [R07.9] Chest pain (Primary)  [D16.02] Enchondroma of humerus, left  [R07.9] Acute chest pain  [R06.00] Dyspnea, unspecified type  [D64.9] Symptomatic anemia  [K92.2] Acute GI bleeding          ED Disposition Condition    Admit               Leander Baez MD  07/08/22 1922       Leander Baez MD  07/08/22 2107

## 2022-07-09 NOTE — H&P
[facility] Hospital Medicine History & Physical Examination       Patient Name: Carmen Bryan  MRN: [id number]  Patient Class: IP- Inpatient   Admission Date: 7/8/2022 10:53 AM  Length of Stay: 0  Admitting Service: Hospital Medicine   Attending Physician: Petros Zhang MD   Primary Care Provider: Leonardo Trevizo MD  History source: EMR, patient and/or patient's family    CHIEF COMPLAINT   Chest Pain and Shortness of Breath (Pt presents c/o Chest tightness with SOB. Onset today.  Denies cardiac hx.  States increased daily stress.  Neg smoker. )      HISTORY OF PRESENT ILLNESS:   Ms. Bryan is a 61 year old female who presented to the ED with c/o SOB, worse with exertion x 2 approximately 2 weeks.  She denies any chest pain, or fever. Denies any cardiac medical history.  She does state recent intermittent epigastric abdominal discomfort over the past several weeks that she attributed to recent stress.  She denies any black or bloody stool.  Emesis once yesterday, states was dark, but not coffee-ground.  Denies any anticoagulant use.  Today's ED lab work was notable for H&H 6.4/24.2, all other indices unremarkable.  CXR showed incidental hiatal hernia, incidental right humeral head enchondroma.  EKG showed SR with short NY. EKG, BNP negative. VSS on RA. She was admitted to hospital medicine for management.    PAST MEDICAL HISTORY:   No past medical history on file.    PAST SURGICAL HISTORY:     Past Surgical History:   Procedure Laterality Date    CHOLECYSTECTOMY         ALLERGIES:   Patient has no known allergies.    FAMILY HISTORY:   Reviewed and non-contributory     SOCIAL HISTORY:     Social History     Tobacco Use    Smoking status: Never Smoker    Smokeless tobacco: Never Used   Substance Use Topics    Alcohol use: Yes     Alcohol/week: 3.0 standard drinks     Types: 3 Cans of beer per week     Comment: occasionally        HOME MEDICATIONS:     Prior to Admission  medications    Medication Sig Start Date End Date Taking? Authorizing Provider   melatonin oral solution Take by mouth every evening.    Historical Provider       REVIEW OF SYSTEMS:   Except as documented, all other systems reviewed and negative     PHYSICAL EXAM:   T 98.4 °F (36.9 °C)   BP (!) 151/82   P 104   RR 20   O2 (!) 94 %  GENERAL: awake, alert, oriented, in no acute distress, non-toxic appearing   HEENT: normocephalic atraumatic   NECK: supple   LUNGS: Clear bilaterally, no wheezing or rales, no accessory muscle use   CVS: Regular rate and rhythm, normal peripheral perfusion  ABD: Soft, non-tender, non-distended, bowel sounds present  EXTREMITIES: no clubbing or cyanosis  SKIN: Warm, dry.   NEURO: alert and oriented, grossly without focal deficits   PSYCHIATRIC: Cooperative    LABS AND IMAGING:     Recent Labs     07/08/22  1353   WBC 12.4*   RBC 3.57*   HGB 6.4*   HCT 24.2*   MCV 67.8*   MCH 17.9*   MCHC 26.4*   RDW 17.6*   *     No results for input(s): LACTIC in the last 72 hours.  No results for input(s): INR, APTT, D-DIMER in the last 72 hours.  No results for input(s): HGBA1C, CHOL, TRIG, LDL, VLDL, HDL in the last 72 hours.   Recent Labs     07/08/22  1353      K 4.3   CHLORIDE 111*   CO2 19*   BUN 13.9   CREATININE 0.75   GLUCOSE 111*   CALCIUM 9.3   ALBUMIN 4.1   GLOBULIN 2.7   ALKPHOS 69   ALT 10   AST 18   BILITOT 0.5     Recent Labs     07/08/22  1353   BNP 61.4   TROPONINI <0.010          X-Ray Chest 1 View  Narrative: EXAMINATION:  XR CHEST 1 VIEW    CLINICAL HISTORY:  ; chest pain;    TECHNIQUE:  Single view (AP) of the chest.    COMPARISON:  None available at the time of initial interpretation.    FINDINGS:  Lines/tubes/devices: none present    The cardiomediastinal silhouette and central pulmonary vasculature are unremarkable for utilized technique.  The trachea is midline. There is no lobar consolidation, pleural effusion, or pneumothorax.    No acute osseous displacement is  identified.  Sclerotic irregularity at the proximal left humerus is nonspecific, with overall appearance suggestive of enchondroma.  The visualized extra thoracic soft tissues and included upper abdomen are without acute abnormality.  Large circumscribed opacity with associated air-fluid level projects over the lower mediastinal midline, consistent with hiatal hernia.  Impression: 1. No convincing acute radiographic abnormality.  2. Incidental large hiatal hernia.  3. Incidental right humeral head finding is of typical radiographic appearance for enchondroma.  Correlation with evidence of pain localization recommended; repeat dedicated shoulder radiographs could be obtained in 2-3 months in order to ensure ongoing stability, given absence of comparison imaging.    Electronically signed by: Tru Real  Date:    07/08/2022  Time:    14:15      ASSESSMENT & PLAN:   1.  Acute symptomatic blood loss anemia  2.  Acute GI bleed    PLAN:  -GI consult  -Transfuse 2 units PRBCs  -Protonix gtt  -clear liquids/NPO after midnight  -Labs in AM      IMattie NP have reviewed and discussed this case with Dr. Zhang.  Please see addendum for further assessment and plan from attending MD.      DVT prophylaxis: SCDs  Code status: Full    __________________________________________________________________  I, Dr. Petros Zhang assumed care of this patient.  For the patient encounter, I performed the substantive portion of the visit, I reviewed the NPPA documentation, treatment plan, and medical decision making.  I had face to face time with this patient.  I have personally reviewed the labs and test results that are presently available. I have reviewed or attempted to review medical records based upon their availability. If patient was admitted under observational status it is with my approval.      61-year-old female presents to the ER with dyspnea and extreme fatigue with workup showing evidence of significant anemia  with hemoglobin of 6.4, MCV low at 67. Her stool was noted to be black and heme-positive in the ER.  Patient reports having upper endoscopy in the remote past for stomach pain but denies a history of GI bleeding.  She does take a significant amount of Aleve for plantar fasciitis which she was told to stop.  Transfuse 2 units of blood, IV Protonix, GI consult.    Time seen: 11PM  Critical care diagnosis:  Anemia requiring transfusion; critical care time 35 minutes  Petros Zhang MD

## 2022-07-10 LAB
ALBUMIN SERPL-MCNC: 3.3 GM/DL (ref 3.5–5)
ALBUMIN/GLOB SERPL: 1.4 RATIO (ref 1.1–2)
ALP SERPL-CCNC: 66 UNIT/L (ref 40–150)
ALT SERPL-CCNC: 8 UNIT/L (ref 0–55)
ANISOCYTOSIS BLD QL SMEAR: ABNORMAL
AST SERPL-CCNC: 15 UNIT/L (ref 5–34)
BASOPHILS # BLD AUTO: 0.07 X10(3)/MCL (ref 0–0.2)
BASOPHILS NFR BLD AUTO: 0.5 %
BILIRUBIN DIRECT+TOT PNL SERPL-MCNC: 1.2 MG/DL
BUN SERPL-MCNC: 7 MG/DL (ref 9.8–20.1)
CALCIUM SERPL-MCNC: 8.3 MG/DL (ref 8.4–10.2)
CHLORIDE SERPL-SCNC: 112 MMOL/L (ref 98–107)
CO2 SERPL-SCNC: 17 MMOL/L (ref 22–29)
CREAT SERPL-MCNC: 0.69 MG/DL (ref 0.55–1.02)
DEPRECATED CALCIDIOL+CALCIFEROL SERPL-MC: 16.8 NG/ML (ref 30–80)
EOSINOPHIL # BLD AUTO: 0.06 X10(3)/MCL (ref 0–0.9)
EOSINOPHIL NFR BLD AUTO: 0.4 %
ERYTHROCYTE [DISTWIDTH] IN BLOOD BY AUTOMATED COUNT: 22.1 % (ref 11.5–17)
GLOBULIN SER-MCNC: 2.3 GM/DL (ref 2.4–3.5)
GLUCOSE SERPL-MCNC: 101 MG/DL (ref 74–100)
HCT VFR BLD AUTO: 28.4 % (ref 37–47)
HGB BLD-MCNC: 8 GM/DL (ref 12–16)
HYPOCHROMIA BLD QL SMEAR: ABNORMAL
IMM GRANULOCYTES # BLD AUTO: 0.12 X10(3)/MCL (ref 0–0.04)
IMM GRANULOCYTES NFR BLD AUTO: 0.9 %
LYMPHOCYTES # BLD AUTO: 1.02 X10(3)/MCL (ref 0.6–4.6)
LYMPHOCYTES NFR BLD AUTO: 7.6 %
MAGNESIUM SERPL-MCNC: 1.9 MG/DL (ref 1.6–2.6)
MCH RBC QN AUTO: 20.5 PG (ref 27–31)
MCHC RBC AUTO-ENTMCNC: 28.2 MG/DL (ref 33–36)
MCV RBC AUTO: 72.6 FL (ref 80–94)
MICROCYTES BLD QL SMEAR: ABNORMAL
MONOCYTES # BLD AUTO: 1.81 X10(3)/MCL (ref 0.1–1.3)
MONOCYTES NFR BLD AUTO: 13.6 %
NEUTROPHILS # BLD AUTO: 10.3 X10(3)/MCL (ref 2.1–9.2)
NEUTROPHILS NFR BLD AUTO: 77 %
NRBC BLD AUTO-RTO: 0.2 %
PLATELET # BLD AUTO: 270 X10(3)/MCL (ref 130–400)
PLATELET # BLD EST: NORMAL 10*3/UL
PMV BLD AUTO: 9.6 FL (ref 7.4–10.4)
POIKILOCYTOSIS BLD QL SMEAR: ABNORMAL
POLYCHROMASIA BLD QL SMEAR: ABNORMAL
POTASSIUM SERPL-SCNC: 3.7 MMOL/L (ref 3.5–5.1)
PROT SERPL-MCNC: 5.6 GM/DL (ref 6.4–8.3)
RBC # BLD AUTO: 3.91 X10(6)/MCL (ref 4.2–5.4)
RBC MORPH BLD: ABNORMAL
SODIUM SERPL-SCNC: 138 MMOL/L (ref 136–145)
SPHEROCYTES BLD QL SMEAR: ABNORMAL
WBC # SPEC AUTO: 13.3 X10(3)/MCL (ref 4.5–11.5)

## 2022-07-10 PROCEDURE — 83735 ASSAY OF MAGNESIUM: CPT | Performed by: INTERNAL MEDICINE

## 2022-07-10 PROCEDURE — 80053 COMPREHEN METABOLIC PANEL: CPT | Performed by: INTERNAL MEDICINE

## 2022-07-10 PROCEDURE — 82306 VITAMIN D 25 HYDROXY: CPT | Performed by: INTERNAL MEDICINE

## 2022-07-10 PROCEDURE — 25000242 PHARM REV CODE 250 ALT 637 W/ HCPCS: Performed by: INTERNAL MEDICINE

## 2022-07-10 PROCEDURE — 85025 COMPLETE CBC W/AUTO DIFF WBC: CPT | Performed by: INTERNAL MEDICINE

## 2022-07-10 PROCEDURE — 25000003 PHARM REV CODE 250: Performed by: INTERNAL MEDICINE

## 2022-07-10 PROCEDURE — 63600175 PHARM REV CODE 636 W HCPCS: Performed by: NURSE PRACTITIONER

## 2022-07-10 PROCEDURE — 36415 COLL VENOUS BLD VENIPUNCTURE: CPT | Performed by: INTERNAL MEDICINE

## 2022-07-10 PROCEDURE — 11000001 HC ACUTE MED/SURG PRIVATE ROOM

## 2022-07-10 PROCEDURE — C9113 INJ PANTOPRAZOLE SODIUM, VIA: HCPCS | Performed by: NURSE PRACTITIONER

## 2022-07-10 PROCEDURE — 25000003 PHARM REV CODE 250: Performed by: NURSE PRACTITIONER

## 2022-07-10 PROCEDURE — 94761 N-INVAS EAR/PLS OXIMETRY MLT: CPT

## 2022-07-10 RX ORDER — SODIUM BICARBONATE 650 MG/1
1300 TABLET ORAL 2 TIMES DAILY
Status: DISCONTINUED | OUTPATIENT
Start: 2022-07-10 | End: 2022-07-11

## 2022-07-10 RX ORDER — ERGOCALCIFEROL 1.25 MG/1
50000 CAPSULE ORAL
Status: DISCONTINUED | OUTPATIENT
Start: 2022-07-10 | End: 2022-07-12 | Stop reason: HOSPADM

## 2022-07-10 RX ORDER — FLUTICASONE PROPIONATE 50 MCG
2 SPRAY, SUSPENSION (ML) NASAL DAILY
Status: DISCONTINUED | OUTPATIENT
Start: 2022-07-10 | End: 2022-07-12 | Stop reason: HOSPADM

## 2022-07-10 RX ADMIN — CETIRIZINE HYDROCHLORIDE 10 MG: 10 TABLET, FILM COATED ORAL at 09:07

## 2022-07-10 RX ADMIN — ACETAMINOPHEN 650 MG: 325 TABLET, FILM COATED ORAL at 09:07

## 2022-07-10 RX ADMIN — SODIUM BICARBONATE 650 MG TABLET 1300 MG: at 09:07

## 2022-07-10 RX ADMIN — ACETAMINOPHEN 650 MG: 325 TABLET, FILM COATED ORAL at 05:07

## 2022-07-10 RX ADMIN — ERGOCALCIFEROL 50000 UNITS: 1.25 CAPSULE ORAL at 11:07

## 2022-07-10 RX ADMIN — PANTOPRAZOLE SODIUM 8 MG/HR: 40 INJECTION, POWDER, FOR SOLUTION INTRAVENOUS at 08:07

## 2022-07-10 RX ADMIN — FLUTICASONE PROPIONATE 100 MCG: 50 SPRAY, METERED NASAL at 08:07

## 2022-07-10 RX ADMIN — PANTOPRAZOLE SODIUM 8 MG/HR: 40 INJECTION, POWDER, FOR SOLUTION INTRAVENOUS at 01:07

## 2022-07-10 RX ADMIN — SODIUM CHLORIDE: 9 INJECTION, SOLUTION INTRAVENOUS at 02:07

## 2022-07-10 NOTE — PROGRESS NOTES
Gastroenterology Progress Note    Subjective/Interval History:  Remains w sob, no abd pain, no bm this am    ROS:  ROS    Vital Signs:  BP (!) 146/78 (BP Location: Left arm, Patient Position: Lying)   Pulse 104   Temp 98.6 °F (37 °C) (Oral)   Resp 17   Wt 93.3 kg (205 lb 11 oz)   SpO2 (!) 94%   BMI 36.44 kg/m²   Body mass index is 36.44 kg/m².    Physical Exam:  Physical Exam    Labs:  Recent Results (from the past 48 hour(s))   CBC with Differential    Collection Time: 07/08/22  1:53 PM   Result Value Ref Range    WBC 12.4 (H) 4.5 - 11.5 x10(3)/mcL    RBC 3.57 (L) 4.20 - 5.40 x10(6)/mcL    Hgb 6.4 (L) 12.0 - 16.0 gm/dL    Hct 24.2 (L) 37.0 - 47.0 %    MCV 67.8 (L) 80.0 - 94.0 fL    MCH 17.9 (L) 27.0 - 31.0 pg    MCHC 26.4 (L) 33.0 - 36.0 mg/dL    RDW 17.6 (H) 11.5 - 17.0 %    Platelet 430 (H) 130 - 400 x10(3)/mcL    MPV 9.6 7.4 - 10.4 fL    Neut % 72.4 %    Lymph % 13.4 %    Mono % 11.3 %    Eos % 1.0 %    Basophil % 0.8 %    Lymph # 1.66 0.6 - 4.6 x10(3)/mcL    Neut # 9.0 2.1 - 9.2 x10(3)/mcL    Mono # 1.40 (H) 0.1 - 1.3 x10(3)/mcL    Eos # 0.13 0 - 0.9 x10(3)/mcL    Baso # 0.10 0 - 0.2 x10(3)/mcL    IG# 0.14 (H) 0 - 0.04 x10(3)/mcL    IG% 1.1 %    NRBC% 0.6 %   Comprehensive Metabolic Panel    Collection Time: 07/08/22  1:53 PM   Result Value Ref Range    Sodium Level 139 136 - 145 mmol/L    Potassium Level 4.3 3.5 - 5.1 mmol/L    Chloride 111 (H) 98 - 107 mmol/L    Carbon Dioxide 19 (L) 22 - 29 mmol/L    Glucose Level 111 (H) 74 - 100 mg/dL    Blood Urea Nitrogen 13.9 9.8 - 20.1 mg/dL    Creatinine 0.75 0.55 - 1.02 mg/dL    Calcium Level Total 9.3 8.4 - 10.2 mg/dL    Protein Total 6.8 6.4 - 8.3 gm/dL    Albumin Level 4.1 3.5 - 5.0 gm/dL    Globulin 2.7 2.4 - 3.5 gm/dL    Albumin/Globulin Ratio 1.5 1.1 - 2.0 ratio    Bilirubin Total 0.5 <=1.5 mg/dL    Alkaline Phosphatase 69 40 - 150 unit/L    Alanine Aminotransferase 10 0 - 55 unit/L    Aspartate Aminotransferase 18 5 - 34 unit/L    Estimated GFR-Non   >60 mls/min/1.73/m2   Troponin I    Collection Time: 07/08/22  1:53 PM   Result Value Ref Range    Troponin-I <0.010 0.000 - 0.045 ng/mL   BNP    Collection Time: 07/08/22  1:53 PM   Result Value Ref Range    Natriuretic Peptide 61.4 <=100.0 pg/mL   Prepare RBC 2 Units; anemia    Collection Time: 07/08/22  6:41 PM   Result Value Ref Range    UNIT NUMBER [id number]     UNIT ABO/RH O POS     DISPENSE STATUS Transfused     Unit Expiration [id number]     Product Code B0338V98     Unit Blood Type Code 5100     CROSSMATCH INTERPRETATION Compatible     UNIT NUMBER [id number]     UNIT ABO/RH O POS     DISPENSE STATUS Transfused     Unit Expiration [id number]     Product Code X6050Q54     Unit Blood Type Code 5100     CROSSMATCH INTERPRETATION Compatible    Type & Screen    Collection Time: 07/08/22  6:41 PM   Result Value Ref Range    Group & Rh O POS     Indirect Anita GEL NEG    ABORH RETYPE    Collection Time: 07/08/22  8:23 PM   Result Value Ref Range    ABORH Retype O POS    Comprehensive Metabolic Panel (CMP)    Collection Time: 07/09/22 10:26 AM   Result Value Ref Range    Sodium Level 141 136 - 145 mmol/L    Potassium Level 4.3 3.5 - 5.1 mmol/L    Chloride 113 (H) 98 - 107 mmol/L    Carbon Dioxide 19 (L) 22 - 29 mmol/L    Glucose Level 101 (H) 74 - 100 mg/dL    Blood Urea Nitrogen 9.9 9.8 - 20.1 mg/dL    Creatinine 0.71 0.55 - 1.02 mg/dL    Calcium Level Total 8.8 8.4 - 10.2 mg/dL    Protein Total 5.9 (L) 6.4 - 8.3 gm/dL    Albumin Level 3.6 3.5 - 5.0 gm/dL    Globulin 2.3 (L) 2.4 - 3.5 gm/dL    Albumin/Globulin Ratio 1.6 1.1 - 2.0 ratio    Bilirubin Total 1.0 <=1.5 mg/dL    Alkaline Phosphatase 64 40 - 150 unit/L    Alanine Aminotransferase 9 0 - 55 unit/L    Aspartate Aminotransferase 15 5 - 34 unit/L    Estimated GFR-Non  >60 mls/min/1.73/m2   CBC with Differential    Collection Time: 07/09/22 10:26 AM   Result Value Ref Range    WBC 10.2 4.5 - 11.5 x10(3)/mcL    RBC  4.05 (L) 4.20 - 5.40 x10(6)/mcL    Hgb 8.5 (L) 12.0 - 16.0 gm/dL    Hct 30.0 (L) 37.0 - 47.0 %    MCV 74.1 (L) 80.0 - 94.0 fL    MCH 21.0 (L) 27.0 - 31.0 pg    MCHC 28.3 (L) 33.0 - 36.0 mg/dL    RDW 21.5 (H) 11.5 - 17.0 %    Platelet 306 130 - 400 x10(3)/mcL    MPV 9.4 7.4 - 10.4 fL    Neut % 76.5 %    Lymph % 7.8 %    Mono % 12.8 %    Eos % 0.8 %    Basophil % 0.8 %    Lymph # 0.80 0.6 - 4.6 x10(3)/mcL    Neut # 7.8 2.1 - 9.2 x10(3)/mcL    Mono # 1.31 (H) 0.1 - 1.3 x10(3)/mcL    Eos # 0.08 0 - 0.9 x10(3)/mcL    Baso # 0.08 0 - 0.2 x10(3)/mcL    IG# 0.13 (H) 0 - 0.04 x10(3)/mcL    IG% 1.3 %    NRBC% 0.4 %   CBC with Differential    Collection Time: 07/10/22  5:14 AM   Result Value Ref Range    WBC 13.3 (H) 4.5 - 11.5 x10(3)/mcL    RBC 3.91 (L) 4.20 - 5.40 x10(6)/mcL    Hgb 8.0 (L) 12.0 - 16.0 gm/dL    Hct 28.4 (L) 37.0 - 47.0 %    MCV 72.6 (L) 80.0 - 94.0 fL    MCH 20.5 (L) 27.0 - 31.0 pg    MCHC 28.2 (L) 33.0 - 36.0 mg/dL    RDW 22.1 (H) 11.5 - 17.0 %    Platelet 270 130 - 400 x10(3)/mcL    MPV 9.6 7.4 - 10.4 fL    Neut % 77.0 %    Lymph % 7.6 %    Mono % 13.6 %    Eos % 0.4 %    Basophil % 0.5 %    Lymph # 1.02 0.6 - 4.6 x10(3)/mcL    Neut # 10.3 (H) 2.1 - 9.2 x10(3)/mcL    Mono # 1.81 (H) 0.1 - 1.3 x10(3)/mcL    Eos # 0.06 0 - 0.9 x10(3)/mcL    Baso # 0.07 0 - 0.2 x10(3)/mcL    IG# 0.12 (H) 0 - 0.04 x10(3)/mcL    IG% 0.9 %    NRBC% 0.2 %   Blood Smear Microscopic Exam    Collection Time: 07/10/22  5:14 AM   Result Value Ref Range    RBC Morph Abnormal (A) Normal    Anisocyte 2+ (A) (none)    Hypochrom 1+ (A) (none)    Microcyte 2+ (A) (none)    Poik 1+ (A) (none)    Polychrom 2+ (A) (none)    Spherocyte 1+ (A) (none)    Platelet Est Normal Normal, Adequate   Comprehensive Metabolic Panel    Collection Time: 07/10/22  5:15 AM   Result Value Ref Range    Sodium Level 138 136 - 145 mmol/L    Potassium Level 3.7 3.5 - 5.1 mmol/L    Chloride 112 (H) 98 - 107 mmol/L    Carbon Dioxide 17 (L) 22 - 29 mmol/L    Glucose  Level 101 (H) 74 - 100 mg/dL    Blood Urea Nitrogen 7.0 (L) 9.8 - 20.1 mg/dL    Creatinine 0.69 0.55 - 1.02 mg/dL    Calcium Level Total 8.3 (L) 8.4 - 10.2 mg/dL    Protein Total 5.6 (L) 6.4 - 8.3 gm/dL    Albumin Level 3.3 (L) 3.5 - 5.0 gm/dL    Globulin 2.3 (L) 2.4 - 3.5 gm/dL    Albumin/Globulin Ratio 1.4 1.1 - 2.0 ratio    Bilirubin Total 1.2 <=1.5 mg/dL    Alkaline Phosphatase 66 40 - 150 unit/L    Alanine Aminotransferase 8 0 - 55 unit/L    Aspartate Aminotransferase 15 5 - 34 unit/L    Estimated GFR-Non  >60 mls/min/1.73/m2   Vitamin D    Collection Time: 07/10/22  5:15 AM   Result Value Ref Range    Vit D 25 OH 16.8 (L) 30.0 - 80.0 ng/mL   Magnesium    Collection Time: 07/10/22  5:15 AM   Result Value Ref Range    Magnesium Level 1.90 1.60 - 2.60 mg/dL         Assessment/Plan:   Acute anemia, melena/overt GI bleeding, NSAID use.  Suspect upper GI bleeding  stable at this point.     Plan  Continue proton pump inhibitor infusion  Regular diet today  Trend hb q8hrs x 3  Plan for EGD on Monday     Royce Keith PA-C

## 2022-07-10 NOTE — PROGRESS NOTES
[facility] Hospital Medicine Progress Note        Chief Complaint: Inpatient Follow-up for GIB    HPI:   Ms. Bryan is a 61 year old female who presented to the ED with c/o SOB, worse with exertion x 2 approximately 2 weeks.  She denies any chest pain, or fever. Denies any cardiac medical history.  She does state recent intermittent epigastric abdominal discomfort over the past several weeks that she attributed to recent stress.  She denies any black or bloody stool.  Emesis once yesterday, states was dark, but not coffee-ground.  Denies any anticoagulant use.  Today's ED lab work was notable for H&H 6.4/24.2, all other indices unremarkable.  CXR showed incidental hiatal hernia, incidental right humeral head enchondroma.  EKG showed SR with short NY. EKG, BNP negative. VSS on RA. She was admitted to hospital medicine for management. 2 units WBC was transfused, Protonix GGT was added and GI was consulted.    Interval Hx:   Afebrile overnight.  Reports intermittent episodes of shortness of breath, dyspnea on exertion and nasal congestion.  Morning labs revealed hemoglobin 8, mild leukocytosis, microcytosis with metabolic acidosis and low vitamin D.    Objective/physical exam:  Vitals:    07/09/22 1928 07/09/22 2300 07/10/22 0400 07/10/22 0841   BP: 138/83 133/85 127/82 (!) 146/78   BP Location:    Left arm   Patient Position:    Lying   Pulse: (!) 114 (!) 114 110 104   Resp: 18 18 18 17   Temp: 99.5 °F (37.5 °C) 99.8 °F (37.7 °C) 99.8 °F (37.7 °C) 98.6 °F (37 °C)   TempSrc: Oral Oral Oral Oral   SpO2: (!) 89% 95% 95% (!) 94%   Weight:         General: In no acute distress, afebrile  Respiratory: Clear to auscultation bilaterally  Cardiovascular: S1, S2, no appreciable murmur  Abdomen: Soft, nontender, BS +  MSK: Warm, no lower extremity edema, no clubbing or cyanosis  Neurologic: Alert and oriented x4, moving all extremities with good strength     Lab Results   Component Value Date    NA  138 07/10/2022    K 3.7 07/10/2022    CO2 17 (L) 07/10/2022    BUN 7.0 (L) 07/10/2022    CREATININE 0.69 07/10/2022    CALCIUM 8.3 (L) 07/10/2022    EGFRNONAA >60 07/10/2022      Lab Results   Component Value Date    ALT 8 07/10/2022    AST 15 07/10/2022    ALKPHOS 66 07/10/2022    BILITOT 1.2 07/10/2022      Lab Results   Component Value Date    WBC 13.3 (H) 07/10/2022    HGB 8.0 (L) 07/10/2022    HCT 28.4 (L) 07/10/2022    MCV 72.6 (L) 07/10/2022     07/10/2022           Medications:   cetirizine  10 mg Oral Daily    ergocalciferol  50,000 Units Oral Q7 Days    sodium bicarbonate  1,300 mg Oral BID      sodium chloride, acetaminophen, diphenhydrAMINE, melatonin, naloxone, ondansetron, sodium chloride 0.9%     Assessment/Plan:    Acute symptomatic anemia due to blood loss  Acute GI bleed  Daily NSAID use  Chronic seasonal allergies    Plan:  - In the setting of complaints of dyspnea on exertion we will get echo, chest x-ray for further evaluation.  - EGD tomorrow. Hb 8.0 today.  Will consider 1 unit of PRBC transfusion if there is no signs of volume overload  - GI following.  Continue Protonix, monitor hemoglobin.  Avoid NSAIDs  - Other medications were reviewed.  Continue Zyrtec.  Add vitamin D    SCDs  Labs for tomorrow    Critical care diagnosis: acute GI bleed needing protonix drip  Critical care time: 50 minutes        Margarito Hook MD

## 2022-07-11 ENCOUNTER — ANESTHESIA (OUTPATIENT)
Dept: ENDOSCOPY | Facility: HOSPITAL | Age: 62
DRG: 378 | End: 2022-07-11
Payer: COMMERCIAL

## 2022-07-11 ENCOUNTER — ANESTHESIA EVENT (OUTPATIENT)
Dept: ENDOSCOPY | Facility: HOSPITAL | Age: 62
DRG: 378 | End: 2022-07-11
Payer: COMMERCIAL

## 2022-07-11 LAB
ABO + RH BLD: NORMAL
ALBUMIN SERPL-MCNC: 3 GM/DL (ref 3.5–5)
ALBUMIN/GLOB SERPL: 1.3 RATIO (ref 1.1–2)
ALP SERPL-CCNC: 64 UNIT/L (ref 40–150)
ALT SERPL-CCNC: 11 UNIT/L (ref 0–55)
AST SERPL-CCNC: 16 UNIT/L (ref 5–34)
AV INDEX (PROSTH): 0.61
AV MEAN GRADIENT: 4 MMHG
AV PEAK GRADIENT: 8 MMHG
AV VALVE AREA: 1.93 CM2
AV VELOCITY RATIO: 0.54
BASOPHILS # BLD AUTO: 0.06 X10(3)/MCL (ref 0–0.2)
BASOPHILS NFR BLD AUTO: 0.5 %
BILIRUBIN DIRECT+TOT PNL SERPL-MCNC: 0.9 MG/DL
BLD PROD TYP BPU: NORMAL
BLOOD UNIT EXPIRATION DATE: NORMAL
BLOOD UNIT TYPE CODE: 9500
BSA FOR ECHO PROCEDURE: 2.04 M2
BUN SERPL-MCNC: 6.2 MG/DL (ref 9.8–20.1)
CALCIUM SERPL-MCNC: 8.3 MG/DL (ref 8.4–10.2)
CHLORIDE SERPL-SCNC: 108 MMOL/L (ref 98–107)
CO2 SERPL-SCNC: 20 MMOL/L (ref 22–29)
CREAT SERPL-MCNC: 0.7 MG/DL (ref 0.55–1.02)
CROSSMATCH INTERPRETATION: NORMAL
CV ECHO LV RWT: 0.4 CM
DISPENSE STATUS: NORMAL
DOP CALC AO PEAK VEL: 1.39 M/S
DOP CALC AO VTI: 19.4 CM
DOP CALC LVOT AREA: 3.1 CM2
DOP CALC LVOT DIAMETER: 2 CM
DOP CALC LVOT PEAK VEL: 0.75 M/S
DOP CALC LVOT STROKE VOLUME: 37.37 CM3
DOP CALC MV VTI: 15.7 CM
DOP CALCLVOT PEAK VEL VTI: 11.9 CM
E WAVE DECELERATION TIME: 129 MSEC
E/A RATIO: 0.89
E/E' RATIO: 5.82 M/S
ECHO LV POSTERIOR WALL: 0.8 CM (ref 0.6–1.1)
EJECTION FRACTION: 55 %
EOSINOPHIL # BLD AUTO: 0.2 X10(3)/MCL (ref 0–0.9)
EOSINOPHIL NFR BLD AUTO: 1.5 %
ERYTHROCYTE [DISTWIDTH] IN BLOOD BY AUTOMATED COUNT: 22.2 % (ref 11.5–17)
FERRITIN SERPL-MCNC: 29.86 NG/ML (ref 4.63–204)
FRACTIONAL SHORTENING: 55 % (ref 28–44)
GLOBULIN SER-MCNC: 2.3 GM/DL (ref 2.4–3.5)
GLUCOSE SERPL-MCNC: 100 MG/DL (ref 74–100)
HCT VFR BLD AUTO: 27.5 % (ref 37–47)
HGB BLD-MCNC: 8 GM/DL (ref 12–16)
IMM GRANULOCYTES # BLD AUTO: 0.07 X10(3)/MCL (ref 0–0.04)
IMM GRANULOCYTES NFR BLD AUTO: 0.5 %
INTERVENTRICULAR SEPTUM: 1 CM (ref 0.6–1.1)
IRON SATN MFR SERPL: 4 % (ref 20–50)
IRON SERPL-MCNC: 13 UG/DL (ref 50–170)
LEFT ATRIUM SIZE: 2.7 CM
LEFT INTERNAL DIMENSION IN SYSTOLE: 1.81 CM (ref 2.1–4)
LEFT VENTRICLE DIASTOLIC VOLUME INDEX: 21.06 ML/M2
LEFT VENTRICLE DIASTOLIC VOLUME: 41.27 ML
LEFT VENTRICLE MASS INDEX: 56 G/M2
LEFT VENTRICLE SYSTOLIC VOLUME INDEX: 9.2 ML/M2
LEFT VENTRICLE SYSTOLIC VOLUME: 18.12 ML
LEFT VENTRICULAR INTERNAL DIMENSION IN DIASTOLE: 4 CM (ref 3.5–6)
LEFT VENTRICULAR MASS: 109.69 G
LV LATERAL E/E' RATIO: 6.4 M/S
LV SEPTAL E/E' RATIO: 5.33 M/S
LVOT MG: 1 MMHG
LVOT MV: 0.51 CM/S
LYMPHOCYTES # BLD AUTO: 1.24 X10(3)/MCL (ref 0.6–4.6)
LYMPHOCYTES NFR BLD AUTO: 9.5 %
MAGNESIUM SERPL-MCNC: 2 MG/DL (ref 1.6–2.6)
MCH RBC QN AUTO: 20.7 PG (ref 27–31)
MCHC RBC AUTO-ENTMCNC: 29.1 MG/DL (ref 33–36)
MCV RBC AUTO: 71.1 FL (ref 80–94)
MONOCYTES # BLD AUTO: 1.65 X10(3)/MCL (ref 0.1–1.3)
MONOCYTES NFR BLD AUTO: 12.6 %
MV MEAN GRADIENT: 2 MMHG
MV PEAK A VEL: 0.72 M/S
MV PEAK E VEL: 0.64 M/S
MV PEAK GRADIENT: 4 MMHG
MV STENOSIS PRESSURE HALF TIME: 75 MS
MV VALVE AREA BY CONTINUITY EQUATION: 2.38 CM2
MV VALVE AREA P 1/2 METHOD: 2.93 CM2
NEUTROPHILS # BLD AUTO: 9.9 X10(3)/MCL (ref 2.1–9.2)
NEUTROPHILS NFR BLD AUTO: 75.4 %
NRBC BLD AUTO-RTO: 0.2 %
PISA TR MAX VEL: 4.95 M/S
PLATELET # BLD AUTO: 245 X10(3)/MCL (ref 130–400)
PMV BLD AUTO: 9.6 FL (ref 7.4–10.4)
POTASSIUM SERPL-SCNC: 3.5 MMOL/L (ref 3.5–5.1)
PROT SERPL-MCNC: 5.3 GM/DL (ref 6.4–8.3)
PV PEAK VELOCITY: 0.98 CM/S
RA PRESSURE: 3 MMHG
RBC # BLD AUTO: 3.87 X10(6)/MCL (ref 4.2–5.4)
RIGHT VENTRICULAR END-DIASTOLIC DIMENSION: 2.9 CM
SODIUM SERPL-SCNC: 140 MMOL/L (ref 136–145)
TDI LATERAL: 0.1 M/S
TDI SEPTAL: 0.12 M/S
TDI: 0.11 M/S
TIBC SERPL-MCNC: 337 UG/DL (ref 70–310)
TIBC SERPL-MCNC: 350 UG/DL (ref 250–450)
TR MAX PG: 98 MMHG
TRICUSPID ANNULAR PLANE SYSTOLIC EXCURSION: 1.84 CM
TV REST PULMONARY ARTERY PRESSURE: 101 MMHG
UNIT NUMBER: NORMAL
WBC # SPEC AUTO: 13.1 X10(3)/MCL (ref 4.5–11.5)

## 2022-07-11 PROCEDURE — 82728 ASSAY OF FERRITIN: CPT | Performed by: INTERNAL MEDICINE

## 2022-07-11 PROCEDURE — P9016 RBC LEUKOCYTES REDUCED: HCPCS | Performed by: INTERNAL MEDICINE

## 2022-07-11 PROCEDURE — 25000003 PHARM REV CODE 250: Performed by: INTERNAL MEDICINE

## 2022-07-11 PROCEDURE — 25000003 PHARM REV CODE 250: Performed by: NURSE PRACTITIONER

## 2022-07-11 PROCEDURE — 11000001 HC ACUTE MED/SURG PRIVATE ROOM

## 2022-07-11 PROCEDURE — 36415 COLL VENOUS BLD VENIPUNCTURE: CPT | Performed by: INTERNAL MEDICINE

## 2022-07-11 PROCEDURE — 80053 COMPREHEN METABOLIC PANEL: CPT | Performed by: INTERNAL MEDICINE

## 2022-07-11 PROCEDURE — 27201423 OPTIME MED/SURG SUP & DEVICES STERILE SUPPLY: Performed by: INTERNAL MEDICINE

## 2022-07-11 PROCEDURE — 25000003 PHARM REV CODE 250: Performed by: NURSE ANESTHETIST, CERTIFIED REGISTERED

## 2022-07-11 PROCEDURE — 43255 EGD CONTROL BLEEDING ANY: CPT | Performed by: INTERNAL MEDICINE

## 2022-07-11 PROCEDURE — 83735 ASSAY OF MAGNESIUM: CPT | Performed by: INTERNAL MEDICINE

## 2022-07-11 PROCEDURE — 37000009 HC ANESTHESIA EA ADD 15 MINS: Performed by: INTERNAL MEDICINE

## 2022-07-11 PROCEDURE — 63600175 PHARM REV CODE 636 W HCPCS: Performed by: NURSE PRACTITIONER

## 2022-07-11 PROCEDURE — C9113 INJ PANTOPRAZOLE SODIUM, VIA: HCPCS | Performed by: NURSE PRACTITIONER

## 2022-07-11 PROCEDURE — 82607 VITAMIN B-12: CPT | Performed by: INTERNAL MEDICINE

## 2022-07-11 PROCEDURE — 25000003 PHARM REV CODE 250: Performed by: PHYSICIAN ASSISTANT

## 2022-07-11 PROCEDURE — 85025 COMPLETE CBC W/AUTO DIFF WBC: CPT | Performed by: INTERNAL MEDICINE

## 2022-07-11 PROCEDURE — 83540 ASSAY OF IRON: CPT | Performed by: INTERNAL MEDICINE

## 2022-07-11 PROCEDURE — 37000008 HC ANESTHESIA 1ST 15 MINUTES: Performed by: INTERNAL MEDICINE

## 2022-07-11 RX ORDER — SODIUM, POTASSIUM,MAG SULFATES 17.5-3.13G
177 SOLUTION, RECONSTITUTED, ORAL ORAL 2 TIMES DAILY
Status: COMPLETED | OUTPATIENT
Start: 2022-07-11 | End: 2022-07-12

## 2022-07-11 RX ORDER — PROCHLORPERAZINE EDISYLATE 5 MG/ML
5 INJECTION INTRAMUSCULAR; INTRAVENOUS EVERY 30 MIN PRN
Status: DISCONTINUED | OUTPATIENT
Start: 2022-07-11 | End: 2022-07-11

## 2022-07-11 RX ORDER — ONDANSETRON 2 MG/ML
4 INJECTION INTRAMUSCULAR; INTRAVENOUS DAILY PRN
Status: DISCONTINUED | OUTPATIENT
Start: 2022-07-11 | End: 2022-07-11

## 2022-07-11 RX ORDER — FUROSEMIDE 20 MG/1
20 TABLET ORAL DAILY
Status: DISCONTINUED | OUTPATIENT
Start: 2022-07-11 | End: 2022-07-12 | Stop reason: HOSPADM

## 2022-07-11 RX ORDER — MEPERIDINE HYDROCHLORIDE 25 MG/ML
12.5 INJECTION INTRAMUSCULAR; INTRAVENOUS; SUBCUTANEOUS EVERY 10 MIN PRN
Status: DISCONTINUED | OUTPATIENT
Start: 2022-07-11 | End: 2022-07-11

## 2022-07-11 RX ORDER — LIDOCAINE HYDROCHLORIDE 20 MG/ML
INJECTION INTRAVENOUS
Status: DISCONTINUED | OUTPATIENT
Start: 2022-07-11 | End: 2022-07-11

## 2022-07-11 RX ORDER — HYDROCODONE BITARTRATE AND ACETAMINOPHEN 500; 5 MG/1; MG/1
TABLET ORAL
Status: DISCONTINUED | OUTPATIENT
Start: 2022-07-11 | End: 2022-07-12 | Stop reason: HOSPADM

## 2022-07-11 RX ORDER — SODIUM CHLORIDE, SODIUM GLUCONATE, SODIUM ACETATE, POTASSIUM CHLORIDE AND MAGNESIUM CHLORIDE 30; 37; 368; 526; 502 MG/100ML; MG/100ML; MG/100ML; MG/100ML; MG/100ML
1000 INJECTION, SOLUTION INTRAVENOUS CONTINUOUS
Status: CANCELLED | OUTPATIENT
Start: 2022-07-11 | End: 2022-08-10

## 2022-07-11 RX ORDER — SODIUM, POTASSIUM,MAG SULFATES 17.5-3.13G
177 SOLUTION, RECONSTITUTED, ORAL ORAL 2 TIMES DAILY
Status: DISCONTINUED | OUTPATIENT
Start: 2022-07-11 | End: 2022-07-11

## 2022-07-11 RX ORDER — LIDOCAINE HYDROCHLORIDE 10 MG/ML
1 INJECTION, SOLUTION EPIDURAL; INFILTRATION; INTRACAUDAL; PERINEURAL ONCE
Status: CANCELLED | OUTPATIENT
Start: 2022-07-11 | End: 2022-07-11

## 2022-07-11 RX ORDER — IPRATROPIUM BROMIDE AND ALBUTEROL SULFATE 2.5; .5 MG/3ML; MG/3ML
3 SOLUTION RESPIRATORY (INHALATION)
Status: DISCONTINUED | OUTPATIENT
Start: 2022-07-11 | End: 2022-07-12 | Stop reason: HOSPADM

## 2022-07-11 RX ORDER — ONDANSETRON 4 MG/1
8 TABLET, ORALLY DISINTEGRATING ORAL EVERY 6 HOURS PRN
Status: CANCELLED | OUTPATIENT
Start: 2022-07-11

## 2022-07-11 RX ADMIN — LIDOCAINE HYDROCHLORIDE 60 MG: 20 INJECTION INTRAVENOUS at 10:07

## 2022-07-11 RX ADMIN — FUROSEMIDE 20 MG: 20 TABLET ORAL at 02:07

## 2022-07-11 RX ADMIN — CETIRIZINE HYDROCHLORIDE 10 MG: 10 TABLET, FILM COATED ORAL at 12:07

## 2022-07-11 RX ADMIN — PANTOPRAZOLE SODIUM 8 MG/HR: 40 INJECTION, POWDER, FOR SOLUTION INTRAVENOUS at 09:07

## 2022-07-11 RX ADMIN — ACETAMINOPHEN 650 MG: 325 TABLET, FILM COATED ORAL at 12:07

## 2022-07-11 RX ADMIN — SODIUM SULFATE, POTASSIUM SULFATE, MAGNESIUM SULFATE 177 ML: 17.5; 3.13; 1.6 SOLUTION, CONCENTRATE ORAL at 06:07

## 2022-07-11 RX ADMIN — PANTOPRAZOLE SODIUM 8 MG/HR: 40 INJECTION, POWDER, FOR SOLUTION INTRAVENOUS at 05:07

## 2022-07-11 RX ADMIN — SODIUM CHLORIDE: 9 INJECTION, SOLUTION INTRAVENOUS at 10:07

## 2022-07-11 RX ADMIN — FLUTICASONE PROPIONATE 100 MCG: 50 SPRAY, METERED NASAL at 12:07

## 2022-07-11 RX ADMIN — SODIUM CHLORIDE: 9 INJECTION, SOLUTION INTRAVENOUS at 07:07

## 2022-07-11 NOTE — ANESTHESIA PREPROCEDURE EVALUATION
07/11/2022  Carmen Bryan is a 61 y.o., female admitted July 8th with shortness of breath and dark emesis x1.  Further workup revealed significant anemia requiring transfusion (PRBC x2).  Patient presents the GI lab for EGD.    Last 3 sets of Vitals    Vitals - 1 value per visit 7/11/2022 7/11/2022 7/11/2022   SYSTOLIC 144 132 140   DIASTOLIC 80 84 85   Pulse 105 107 110   Temp 98.7 98.4 99.1   Resp - 18 27   SPO2 92 92 97   Weight (lb) - - -   Weight (kg) - - -   Height - - -   BMI (Calculated) - - -   VISIT REPORT - - -       Recent Results (from the past 336 hour(s))   CBC with Differential    Collection Time: 07/11/22  5:35 AM   Result Value Ref Range    WBC 13.1 (H) 4.5 - 11.5 x10(3)/mcL    Hgb 8.0 (L) 12.0 - 16.0 gm/dL    Hct 27.5 (L) 37.0 - 47.0 %    Platelet 245 130 - 400 x10(3)/mcL   CBC with Differential    Collection Time: 07/10/22  5:14 AM   Result Value Ref Range    WBC 13.3 (H) 4.5 - 11.5 x10(3)/mcL    Hgb 8.0 (L) 12.0 - 16.0 gm/dL    Hct 28.4 (L) 37.0 - 47.0 %    Platelet 270 130 - 400 x10(3)/mcL   CBC with Differential    Collection Time: 07/09/22 10:26 AM   Result Value Ref Range    WBC 10.2 4.5 - 11.5 x10(3)/mcL    Hgb 8.5 (L) 12.0 - 16.0 gm/dL    Hct 30.0 (L) 37.0 - 47.0 %    Platelet 306 130 - 400 x10(3)/mcL          BMP  Lab Results   Component Value Date     07/11/2022    K 3.5 07/11/2022    CO2 20 (L) 07/11/2022    BUN 6.2 (L) 07/11/2022    CREATININE 0.70 07/11/2022    CALCIUM 8.3 (L) 07/11/2022    EGFRNONAA >60 07/11/2022        CMP  Sodium Level   Date Value Ref Range Status   07/11/2022 140 136 - 145 mmol/L Final     Potassium Level   Date Value Ref Range Status   07/11/2022 3.5 3.5 - 5.1 mmol/L Final     Carbon Dioxide   Date Value Ref Range Status   07/11/2022 20 (L) 22 - 29 mmol/L Final     Blood Urea Nitrogen   Date Value Ref Range Status   07/11/2022 6.2 (L) 9.8 - 20.1  mg/dL Final     Creatinine   Date Value Ref Range Status   07/11/2022 0.70 0.55 - 1.02 mg/dL Final     Calcium Level Total   Date Value Ref Range Status   07/11/2022 8.3 (L) 8.4 - 10.2 mg/dL Final     Albumin Level   Date Value Ref Range Status   07/11/2022 3.0 (L) 3.5 - 5.0 gm/dL Final     Bilirubin Total   Date Value Ref Range Status   07/11/2022 0.9 <=1.5 mg/dL Final     Alkaline Phosphatase   Date Value Ref Range Status   07/11/2022 64 40 - 150 unit/L Final     Aspartate Aminotransferase   Date Value Ref Range Status   07/11/2022 16 5 - 34 unit/L Final     Alanine Aminotransferase   Date Value Ref Range Status   07/11/2022 11 0 - 55 unit/L Final     Estimated GFR-Non    Date Value Ref Range Status   07/11/2022 >60 mls/min/1.73/m2 Final      BNP  Recent Labs   Lab 07/08/22  1353   BNP 61.4        Recent Labs   Lab 07/08/22  1353   TROPONINI <0.010      Pre-op Assessment    I have reviewed the Patient Summary Reports.     I have reviewed the Nursing Notes. I have reviewed the NPO Status.   I have reviewed the Medications.     Review of Systems  Anesthesia Hx:  Personal Hx of Anesthesia complications Denies Post-Operative Nausea/Vomiting.  Denies Difficult Intubation.  Denies Dental Injury.   Social:  Non-Smoker    Cardiovascular:  Cardiovascular Normal   Functional Capacity good / => 4 METS    Pulmonary:  Pulmonary Normal    Hepatic/GI:   GERD, well controlled        Physical Exam  General: Well nourished, Cooperative, Alert and Oriented    Airway:  Mallampati: II   Mouth Opening: Small, but > 3cm  TM Distance: Normal  Tongue: Normal  Neck ROM: Normal ROM    Dental:  Intact    Chest/Lungs:  Clear to auscultation, Normal Respiratory Rate    Heart:  Rate: Normal  Rhythm: Regular Rhythm        Anesthesia Plan  Type of Anesthesia, risks & benefits discussed:    Anesthesia Type: Gen Natural Airway  Intra-op Monitoring Plan: Standard ASA Monitors  Induction:  IV  Informed Consent: Informed consent signed  with the Patient and all parties understand the risks and agree with anesthesia plan.  All questions answered.   ASA Score: 3  Day of Surgery Review of History & Physical: H&P Update referred to the surgeon/provider.    Ready For Surgery From Anesthesia Perspective.     .

## 2022-07-11 NOTE — PROGRESS NOTES
Patient with hiatal hernia and questionable AVM status post ablation.  Recommend colonoscopy.  Patient like to do on outpatient basis.  Okay to discharge patient from GI perspective we will call her to arrange for colonoscopy

## 2022-07-11 NOTE — TRANSFER OF CARE
"Anesthesia Transfer of Care Note    Patient: Carmen Bryan    Procedure(s) Performed: Procedure(s) (LRB):  EGD (ESOPHAGOGASTRODUODENOSCOPY) (N/A)  EGD,WITH HEMORRHAGE CONTROL    Patient location: GI    Anesthesia Type: general    Transport from OR: Transported from OR on room air with adequate spontaneous ventilation    Post pain: adequate analgesia    Post assessment: no apparent anesthetic complications and tolerated procedure well    Post vital signs: stable    Level of consciousness: awake and alert    Nausea/Vomiting: no nausea/vomiting    Complications: none    Transfer of care protocol was followed      Last vitals:   Visit Vitals  BP (!) 140/85   Pulse 110   Temp 37.3 °C (99.1 °F)   Resp (!) 27   Ht 5' 2.99" (1.6 m)   Wt 93.3 kg (205 lb 11 oz)   SpO2 97%   BMI 36.45 kg/m²     "

## 2022-07-11 NOTE — PROGRESS NOTES
"[facility]  Hospital Medicine Progress Note        Chief Complaint: Inpatient Follow-up for GIB    HPI:   Ms. Bryan is a 61 year old female who presented to the ED with c/o SOB, worse with exertion x 2 approximately 2 weeks.  She denies any chest pain, or fever. Denies any cardiac medical history.  She does state recent intermittent epigastric abdominal discomfort over the past several weeks that she attributed to recent stress.  She denies any black or bloody stool.  Emesis once yesterday, states was dark, but not coffee-ground.  Denies any anticoagulant use.  Today's ED lab work was notable for H&H 6.4/24.2, all other indices unremarkable.  CXR showed incidental hiatal hernia, incidental right humeral head enchondroma.  EKG showed SR with short CT. EKG, BNP negative. VSS on RA. She was admitted to hospital medicine for management. 2 units WBC was transfused, Protonix GGT was added and GI was consulted.    Interval Hx:   Afebrile.  Intermittent tachycardia observed.  Scheduled for GI lab today.  Morning labs revealed persistent leukocytosis, hemoglobin 8.0 with microcytosis.  Iron was low.  Metabolic acidosis continues.    TTE revealed EF 55-60%, severe TR, severe pulmonary hypertension.  Chest x-ray showed incidental large hiatal hernia, incidental right humeral head finding for enchondroma.  Radiology suggested repeating imaging in 2 to 3 months.      Objective/physical exam:  Vitals:    07/10/22 1137 07/10/22 1439 07/10/22 2236 07/11/22 0021   BP: (!) 146/80 132/87  (!) 144/80   BP Location: Left arm      Patient Position: Sitting      Pulse: 103 102  105   Resp: 16 20     Temp: 98 °F (36.7 °C) 98.3 °F (36.8 °C)  98.7 °F (37.1 °C)   TempSrc: Oral Oral  Oral   SpO2: (!) 93% (!) 92% (!) 91% (!) 92%   Weight:  93.3 kg (205 lb 11 oz)     Height:  5' 2.99" (1.6 m)       General: In no acute distress, afebrile  Respiratory: Clear to auscultation bilaterally  Cardiovascular: S1, S2, no " appreciable murmur  Abdomen: Soft, nontender, BS +  MSK: Warm, no lower extremity edema, no clubbing or cyanosis  Neurologic: Alert and oriented x4, moving all extremities with good strength     Lab Results   Component Value Date     07/11/2022    K 3.5 07/11/2022    CO2 20 (L) 07/11/2022    BUN 6.2 (L) 07/11/2022    CREATININE 0.70 07/11/2022    CALCIUM 8.3 (L) 07/11/2022    EGFRNONAA >60 07/11/2022      Lab Results   Component Value Date    ALT 11 07/11/2022    AST 16 07/11/2022    ALKPHOS 64 07/11/2022    BILITOT 0.9 07/11/2022      Lab Results   Component Value Date    WBC 13.1 (H) 07/11/2022    HGB 8.0 (L) 07/11/2022    HCT 27.5 (L) 07/11/2022    MCV 71.1 (L) 07/11/2022     07/11/2022           Medications:   cetirizine  10 mg Oral Daily    ergocalciferol  50,000 Units Oral Q7 Days    fluticasone propionate  2 spray Each Nostril Daily      sodium chloride, acetaminophen, diphenhydrAMINE, melatonin, naloxone, ondansetron, sodium chloride 0.9%     Assessment/Plan:    Acute symptomatic anemia due to blood loss  Acute GI bleed  Daily NSAID use  Chronic seasonal allergies    Plan:  - We will transfuse 1 unit of PRBC today.  - EGD revealed questionable AVM that was ablated.  GI suggested colonoscopy and patient wants to get the procedure outpatient.  Cleared for discharge from GI standpoint.  Continue Protonix.  Avoid NSAIDs  - TTE revealed severe TR with pulmonary hypertension, intact EF.  Chest x-ray showed no acute pulmonary findings.  We will add daily Lasix and suggest outpatient cardiology follow-up.  - Incidental enchondroma was noted on x-ray for which radial suggested repeat imaging in 3 months.  - Other home medications were reviewed and renewed.  Continue vitamin D    SCDs  labs                Margarito Hook MD

## 2022-07-11 NOTE — PROGRESS NOTES
Pt with no events over night  Vs af  abd soft ntnd  A/p 61 year old female with symptomatic anemia and melena plan for egd

## 2022-07-11 NOTE — ANESTHESIA POSTPROCEDURE EVALUATION
Anesthesia Post Evaluation    Patient: Carmen Bryan    Procedure(s) Performed: Procedure(s) (LRB):  EGD (ESOPHAGOGASTRODUODENOSCOPY) (N/A)  EGD,WITH HEMORRHAGE CONTROL    Final Anesthesia Type: general      Patient location during evaluation: floor  Patient participation: Yes- Able to Participate  Level of consciousness: awake and alert  Post-procedure vital signs: reviewed and stable  Pain management: adequate  Airway patency: patent    PONV status at discharge: No PONV  Anesthetic complications: no      Cardiovascular status: blood pressure returned to baseline  Respiratory status: spontaneous ventilation and room air  Hydration status: euvolemic  Follow-up not needed.          Vitals Value Taken Time   /78 07/11/22 1119   Temp  07/11/22 1143   Pulse 100 07/11/22 1119   Resp 20 07/11/22 1119   SpO2 98 % 07/11/22 1119         No case tracking events are documented in the log.      Pain/Robbin Score: Pain Rating Prior to Med Admin: 7 (7/10/2022  5:21 PM)  Pain Rating Post Med Admin: 2 (7/10/2022 10:03 AM)  Robbin Score: 9 (7/11/2022 11:09 AM)

## 2022-07-12 ENCOUNTER — ANESTHESIA EVENT (OUTPATIENT)
Dept: ENDOSCOPY | Facility: HOSPITAL | Age: 62
DRG: 378 | End: 2022-07-12
Payer: COMMERCIAL

## 2022-07-12 ENCOUNTER — ANESTHESIA (OUTPATIENT)
Dept: ENDOSCOPY | Facility: HOSPITAL | Age: 62
DRG: 378 | End: 2022-07-12
Payer: COMMERCIAL

## 2022-07-12 VITALS
TEMPERATURE: 98 F | DIASTOLIC BLOOD PRESSURE: 85 MMHG | OXYGEN SATURATION: 96 % | BODY MASS INDEX: 36.45 KG/M2 | HEIGHT: 63 IN | WEIGHT: 205.69 LBS | RESPIRATION RATE: 20 BRPM | HEART RATE: 94 BPM | SYSTOLIC BLOOD PRESSURE: 145 MMHG

## 2022-07-12 PROBLEM — D64.9 SYMPTOMATIC ANEMIA: Status: ACTIVE | Noted: 2022-07-12

## 2022-07-12 PROBLEM — D64.9 SYMPTOMATIC ANEMIA: Status: RESOLVED | Noted: 2022-07-12 | Resolved: 2022-07-12

## 2022-07-12 LAB
ALBUMIN SERPL-MCNC: 3 GM/DL (ref 3.5–5)
ALBUMIN/GLOB SERPL: 1.3 RATIO (ref 1.1–2)
ALP SERPL-CCNC: 62 UNIT/L (ref 40–150)
ALT SERPL-CCNC: 10 UNIT/L (ref 0–55)
AST SERPL-CCNC: 15 UNIT/L (ref 5–34)
BASOPHILS # BLD AUTO: 0.08 X10(3)/MCL (ref 0–0.2)
BASOPHILS NFR BLD AUTO: 0.7 %
BILIRUBIN DIRECT+TOT PNL SERPL-MCNC: 1.1 MG/DL
BUN SERPL-MCNC: 6.6 MG/DL (ref 9.8–20.1)
CALCIUM SERPL-MCNC: 8.2 MG/DL (ref 8.4–10.2)
CHLORIDE SERPL-SCNC: 109 MMOL/L (ref 98–107)
CO2 SERPL-SCNC: 20 MMOL/L (ref 22–29)
CREAT SERPL-MCNC: 0.67 MG/DL (ref 0.55–1.02)
EOSINOPHIL # BLD AUTO: 0.32 X10(3)/MCL (ref 0–0.9)
EOSINOPHIL NFR BLD AUTO: 2.9 %
ERYTHROCYTE [DISTWIDTH] IN BLOOD BY AUTOMATED COUNT: 23.6 % (ref 11.5–17)
FOLATE SERPL-MCNC: 12.7 NG/ML (ref 7–31.4)
GLOBULIN SER-MCNC: 2.3 GM/DL (ref 2.4–3.5)
GLUCOSE SERPL-MCNC: 89 MG/DL (ref 74–100)
HCT VFR BLD AUTO: 30.7 % (ref 37–47)
HGB BLD-MCNC: 9 GM/DL (ref 12–16)
IMM GRANULOCYTES # BLD AUTO: 0.05 X10(3)/MCL (ref 0–0.04)
IMM GRANULOCYTES NFR BLD AUTO: 0.5 %
LYMPHOCYTES # BLD AUTO: 1.44 X10(3)/MCL (ref 0.6–4.6)
LYMPHOCYTES NFR BLD AUTO: 13.1 %
MAGNESIUM SERPL-MCNC: 1.9 MG/DL (ref 1.6–2.6)
MCH RBC QN AUTO: 21.9 PG (ref 27–31)
MCHC RBC AUTO-ENTMCNC: 29.3 MG/DL (ref 33–36)
MCV RBC AUTO: 74.7 FL (ref 80–94)
MONOCYTES # BLD AUTO: 1.39 X10(3)/MCL (ref 0.1–1.3)
MONOCYTES NFR BLD AUTO: 12.6 %
NEUTROPHILS # BLD AUTO: 7.7 X10(3)/MCL (ref 2.1–9.2)
NEUTROPHILS NFR BLD AUTO: 70.2 %
NRBC BLD AUTO-RTO: 0 %
PLATELET # BLD AUTO: 234 X10(3)/MCL (ref 130–400)
PMV BLD AUTO: 9.8 FL (ref 7.4–10.4)
POTASSIUM SERPL-SCNC: 3.2 MMOL/L (ref 3.5–5.1)
PROT SERPL-MCNC: 5.3 GM/DL (ref 6.4–8.3)
RBC # BLD AUTO: 4.11 X10(6)/MCL (ref 4.2–5.4)
SODIUM SERPL-SCNC: 141 MMOL/L (ref 136–145)
VIT B12 SERPL-MCNC: 689 PG/ML (ref 213–816)
WBC # SPEC AUTO: 11 X10(3)/MCL (ref 4.5–11.5)

## 2022-07-12 PROCEDURE — 37000009 HC ANESTHESIA EA ADD 15 MINS: Performed by: INTERNAL MEDICINE

## 2022-07-12 PROCEDURE — 82746 ASSAY OF FOLIC ACID SERUM: CPT | Performed by: INTERNAL MEDICINE

## 2022-07-12 PROCEDURE — 25000003 PHARM REV CODE 250: Performed by: NURSE ANESTHETIST, CERTIFIED REGISTERED

## 2022-07-12 PROCEDURE — 80053 COMPREHEN METABOLIC PANEL: CPT | Performed by: INTERNAL MEDICINE

## 2022-07-12 PROCEDURE — 45378 DIAGNOSTIC COLONOSCOPY: CPT | Performed by: INTERNAL MEDICINE

## 2022-07-12 PROCEDURE — 37000008 HC ANESTHESIA 1ST 15 MINUTES: Performed by: INTERNAL MEDICINE

## 2022-07-12 PROCEDURE — 36415 COLL VENOUS BLD VENIPUNCTURE: CPT | Performed by: INTERNAL MEDICINE

## 2022-07-12 PROCEDURE — 25000003 PHARM REV CODE 250: Performed by: INTERNAL MEDICINE

## 2022-07-12 PROCEDURE — 63600175 PHARM REV CODE 636 W HCPCS: Performed by: NURSE PRACTITIONER

## 2022-07-12 PROCEDURE — 63600175 PHARM REV CODE 636 W HCPCS: Performed by: NURSE ANESTHETIST, CERTIFIED REGISTERED

## 2022-07-12 PROCEDURE — 85025 COMPLETE CBC W/AUTO DIFF WBC: CPT | Performed by: INTERNAL MEDICINE

## 2022-07-12 PROCEDURE — C9113 INJ PANTOPRAZOLE SODIUM, VIA: HCPCS | Performed by: NURSE PRACTITIONER

## 2022-07-12 PROCEDURE — 25000003 PHARM REV CODE 250: Performed by: NURSE PRACTITIONER

## 2022-07-12 PROCEDURE — 83735 ASSAY OF MAGNESIUM: CPT | Performed by: INTERNAL MEDICINE

## 2022-07-12 RX ORDER — PROPOFOL 10 MG/ML
VIAL (ML) INTRAVENOUS
Status: COMPLETED
Start: 2022-07-12 | End: 2022-07-12

## 2022-07-12 RX ORDER — SODIUM CHLORIDE, SODIUM LACTATE, POTASSIUM CHLORIDE, CALCIUM CHLORIDE 600; 310; 30; 20 MG/100ML; MG/100ML; MG/100ML; MG/100ML
INJECTION, SOLUTION INTRAVENOUS CONTINUOUS PRN
Status: DISCONTINUED | OUTPATIENT
Start: 2022-07-12 | End: 2022-07-12

## 2022-07-12 RX ORDER — ERGOCALCIFEROL 1.25 MG/1
50000 CAPSULE ORAL
Qty: 4 CAPSULE | Refills: 2 | Status: SHIPPED | OUTPATIENT
Start: 2022-07-17 | End: 2022-10-03

## 2022-07-12 RX ORDER — DOCUSATE SODIUM 100 MG/1
100 CAPSULE, LIQUID FILLED ORAL 2 TIMES DAILY
Qty: 60 CAPSULE | Refills: 0 | Status: SHIPPED | OUTPATIENT
Start: 2022-07-12 | End: 2022-08-11

## 2022-07-12 RX ORDER — FLUTICASONE PROPIONATE 50 MCG
2 SPRAY, SUSPENSION (ML) NASAL DAILY
Qty: 16 G | Refills: 0 | Status: SHIPPED | OUTPATIENT
Start: 2022-07-13 | End: 2022-09-11

## 2022-07-12 RX ORDER — DOCUSATE SODIUM 100 MG/1
100 CAPSULE, LIQUID FILLED ORAL 2 TIMES DAILY
Status: DISCONTINUED | OUTPATIENT
Start: 2022-07-12 | End: 2022-07-12 | Stop reason: HOSPADM

## 2022-07-12 RX ORDER — LANOLIN ALCOHOL/MO/W.PET/CERES
1 CREAM (GRAM) TOPICAL 2 TIMES DAILY
Status: DISCONTINUED | OUTPATIENT
Start: 2022-07-12 | End: 2022-07-12 | Stop reason: HOSPADM

## 2022-07-12 RX ORDER — LIDOCAINE HCL/PF 100 MG/5ML
SYRINGE (ML) INTRAVENOUS
Status: DISCONTINUED | OUTPATIENT
Start: 2022-07-12 | End: 2022-07-12

## 2022-07-12 RX ORDER — PROPOFOL 10 MG/ML
VIAL (ML) INTRAVENOUS
Status: DISCONTINUED | OUTPATIENT
Start: 2022-07-12 | End: 2022-07-12

## 2022-07-12 RX ORDER — ONDANSETRON 2 MG/ML
4 INJECTION INTRAMUSCULAR; INTRAVENOUS DAILY PRN
Status: DISCONTINUED | OUTPATIENT
Start: 2022-07-12 | End: 2022-07-12 | Stop reason: HOSPADM

## 2022-07-12 RX ORDER — FUROSEMIDE 20 MG/1
20 TABLET ORAL DAILY
Qty: 30 TABLET | Refills: 1 | Status: SHIPPED | OUTPATIENT
Start: 2022-07-13 | End: 2022-09-11

## 2022-07-12 RX ORDER — FERROUS SULFATE 324(65)MG
324 TABLET, DELAYED RELEASE (ENTERIC COATED) ORAL 2 TIMES DAILY
Qty: 180 TABLET | Refills: 0 | Status: SHIPPED | OUTPATIENT
Start: 2022-07-12 | End: 2022-10-10

## 2022-07-12 RX ORDER — PANTOPRAZOLE SODIUM 40 MG/1
40 TABLET, DELAYED RELEASE ORAL DAILY
Qty: 30 TABLET | Refills: 2 | Status: SHIPPED | OUTPATIENT
Start: 2022-07-13 | End: 2023-07-13

## 2022-07-12 RX ORDER — PANTOPRAZOLE SODIUM 40 MG/1
40 TABLET, DELAYED RELEASE ORAL DAILY
Status: DISCONTINUED | OUTPATIENT
Start: 2022-07-12 | End: 2022-07-12 | Stop reason: HOSPADM

## 2022-07-12 RX ORDER — SODIUM CHLORIDE, SODIUM GLUCONATE, SODIUM ACETATE, POTASSIUM CHLORIDE AND MAGNESIUM CHLORIDE 30; 37; 368; 526; 502 MG/100ML; MG/100ML; MG/100ML; MG/100ML; MG/100ML
1000 INJECTION, SOLUTION INTRAVENOUS CONTINUOUS
Status: CANCELLED | OUTPATIENT
Start: 2022-07-12 | End: 2022-08-11

## 2022-07-12 RX ORDER — SODIUM CHLORIDE, SODIUM LACTATE, POTASSIUM CHLORIDE, CALCIUM CHLORIDE 600; 310; 30; 20 MG/100ML; MG/100ML; MG/100ML; MG/100ML
INJECTION, SOLUTION INTRAVENOUS CONTINUOUS
Status: CANCELLED | OUTPATIENT
Start: 2022-07-12

## 2022-07-12 RX ADMIN — PANTOPRAZOLE SODIUM 40 MG: 40 TABLET, DELAYED RELEASE ORAL at 10:07

## 2022-07-12 RX ADMIN — DOCUSATE SODIUM 100 MG: 100 CAPSULE, LIQUID FILLED ORAL at 10:07

## 2022-07-12 RX ADMIN — FERROUS SULFATE TAB 325 MG (65 MG ELEMENTAL FE) 1 EACH: 325 (65 FE) TAB at 10:07

## 2022-07-12 RX ADMIN — PROPOFOL 100 MG: 10 INJECTION, EMULSION INTRAVENOUS at 08:07

## 2022-07-12 RX ADMIN — PROPOFOL 200 MG: 10 INJECTION, EMULSION INTRAVENOUS at 08:07

## 2022-07-12 RX ADMIN — CETIRIZINE HYDROCHLORIDE 10 MG: 10 TABLET, FILM COATED ORAL at 10:07

## 2022-07-12 RX ADMIN — FLUTICASONE PROPIONATE 100 MCG: 50 SPRAY, METERED NASAL at 11:07

## 2022-07-12 RX ADMIN — SODIUM CHLORIDE, POTASSIUM CHLORIDE, SODIUM LACTATE AND CALCIUM CHLORIDE: 600; 310; 30; 20 INJECTION, SOLUTION INTRAVENOUS at 08:07

## 2022-07-12 RX ADMIN — FUROSEMIDE 20 MG: 20 TABLET ORAL at 10:07

## 2022-07-12 RX ADMIN — SODIUM SULFATE, POTASSIUM SULFATE, MAGNESIUM SULFATE 177 ML: 17.5; 3.13; 1.6 SOLUTION, CONCENTRATE ORAL at 04:07

## 2022-07-12 RX ADMIN — LIDOCAINE HYDROCHLORIDE 20 MG: 20 INJECTION, SOLUTION INTRAVENOUS at 08:07

## 2022-07-12 RX ADMIN — PANTOPRAZOLE SODIUM 8 MG/HR: 40 INJECTION, POWDER, FOR SOLUTION INTRAVENOUS at 02:07

## 2022-07-12 NOTE — TRANSFER OF CARE
"Anesthesia Transfer of Care Note    Patient: Carmen Bryan    Procedure(s) Performed: Procedure(s) (LRB):  COLON (N/A)    Patient location: PACU    Anesthesia Type: general    Transport from OR: Transported from OR on room air with adequate spontaneous ventilation    Post pain: adequate analgesia    Post assessment: no apparent anesthetic complications    Post vital signs: stable    Level of consciousness: sedated and responds to stimulation    Nausea/Vomiting: no nausea/vomiting    Complications: none    Transfer of care protocol was followed      Last vitals:   Visit Vitals  /83 (BP Location: Left arm, Patient Position: Lying)   Pulse 107   Temp 36.7 °C (98.1 °F) (Temporal)   Resp 20   Ht 5' 2.99" (1.6 m)   Wt 93.3 kg (205 lb 11 oz)   SpO2 95%   BMI 36.45 kg/m²     "

## 2022-07-12 NOTE — DISCHARGE SUMMARY
[facility] - 6th Floor [facility] Medicine  Discharge Summary      Patient Name: Carmen Bryan  MRN: [id number]  Patient Class: IP- Inpatient  Admission Date: 7/8/2022  Hospital Length of Stay: 4 days  Discharge Date and Time:  07/12/2022 12:54 PM  Attending Physician: Petros Zhang MD   Discharging Provider: Margarito Hook MD  Primary Care Provider: Leonardo Trevizo MD      Ms. Bryan is a 61 year old female who presented to the ED with c/o SOB, worse with exertion x 2 approximately 2 weeks.  She denies any chest pain, or fever. Denies any cardiac medical history.  She does state recent intermittent epigastric abdominal discomfort over the past several weeks that she attributed to recent stress.  She denies any black or bloody stool.  Emesis once yesterday, states was dark, but not coffee-ground.  Denies any anticoagulant use.  Today's ED lab work was notable for H&H 6.4/24.2, all other indices unremarkable.  CXR showed incidental hiatal hernia, incidental right humeral head enchondroma.  EKG showed SR with short LA. EKG, BNP negative. VSS on RA. She was admitted to hospital medicine for management. 2 units WBC was transfused, Protonix GGT was added and GI was consulted. EGD revealed questionable AVM that was ablated.  GI suggested colonoscopy. Work showed iron deficiency. Colonoscopy was normal. Hospital stay was complicated with SOB. CXR showed no acute pulmonary findings. TTE revealed severe TR with pulmonary hypertension, intact EF. Lasix was added. Gi cleared for dc. She will follow with PCP and pulmonary. Iron therapy was added. Prior to dc all her meds were reconciled and prescriptions were sent. Advised to avoid NSAIDS.    Acute symptomatic anemia due to blood loss s/p blood transfusion  Acute GI bleed  Daily NSAID use  Chronic seasonal allergies  Pulmonary hypertension  VHD- TR  Obesity, possible CELESTINA      Procedure(s) (LRB):  COLON (N/A)        Goals of Care  Treatment Preferences:  Code Status: Full Code      Consults:   Consults (From admission, onward)        Status Ordering Provider     Inpatient consult to Gastroenterology  Once        Provider:  Royce Keith MD    Acknowledged DONNA HAWK          No new Assessment & Plan notes have been filed under this hospital service since the last note was generated.  Service: Hospital Medicine    Final Active Diagnoses:      Problems Resolved During this Admission:    Diagnosis Date Noted Date Resolved POA    PRINCIPAL PROBLEM:  Symptomatic anemia [D64.9] 07/12/2022 07/12/2022 Yes       Discharged Condition: good    Disposition:     Follow Up:   Follow-up Information     Leonardo Trevizo MD Follow up in 1 week(s).    Specialty: Family Medicine  Contact information:  [address] [facility] [address]  [phone]                       Patient Instructions:   No discharge procedures on file.    Significant Diagnostic Studies: Labs:   CMP   Recent Labs   Lab 07/11/22  0535 07/12/22  0315    141   K 3.5 3.2*   CO2 20* 20*   BUN 6.2* 6.6*   CREATININE 0.70 0.67   CALCIUM 8.3* 8.2*   ALBUMIN 3.0* 3.0*   BILITOT 0.9 1.1   ALKPHOS 64 62   AST 16 15   ALT 11 10   EGFRNONAA >60 >60       Pending Diagnostic Studies:     None         Medications:  Reconciled Home Medications:      Medication List      START taking these medications    docusate sodium 100 MG capsule  Commonly known as: COLACE  Take 1 capsule (100 mg total) by mouth 2 (two) times daily.     ergocalciferol 50,000 unit Cap  Commonly known as: ERGOCALCIFEROL  Take 1 capsule (50,000 Units total) by mouth every 7 days. for 12 doses  Start taking on: July 17, 2022     ferrous sulfate 324 mg (65 mg iron) Tbec  Take 1 tablet (324 mg total) by mouth 2 (two) times daily.     fluticasone propionate 50 mcg/actuation nasal spray  Commonly known as: FLONASE  2 sprays (100 mcg total) by Each Nostril route once daily.  Start taking on: July  13, 2022     furosemide 20 MG tablet  Commonly known as: LASIX  Take 1 tablet (20 mg total) by mouth once daily.  Start taking on: July 13, 2022     pantoprazole 40 MG tablet  Commonly known as: PROTONIX  Take 1 tablet (40 mg total) by mouth once daily.  Start taking on: July 13, 2022        CONTINUE taking these medications    melatonin oral solution  Take by mouth every evening.            Indwelling Lines/Drains at time of discharge:   Lines/Drains/Airways     None                 Time spent on the discharge of patient: 35 minutes         Margarito Hook MD  Department of Hospital Medicine  [facility] - 6th Floor Medical Telemetry

## 2022-07-12 NOTE — ANESTHESIA POSTPROCEDURE EVALUATION
Anesthesia Post Evaluation    Patient: Carmen Bryan    Procedure(s) Performed: Procedure(s) (LRB):  COLON (N/A)    Final Anesthesia Type: general      Patient location during evaluation: PACU  Patient participation: Yes- Able to Participate  Level of consciousness: awake  Post-procedure vital signs: reviewed and stable  Pain management: adequate  Airway patency: patent    PONV status at discharge: vomiting (controlled) and nausea (inadequately controlled)  Anesthetic complications: no      Cardiovascular status: hemodynamically stable  Respiratory status: spontaneous ventilation and unassisted  Hydration status: euvolemic  Follow-up not needed.  Comments:              Vitals Value Taken Time   /75 07/12/22 0908   Temp  07/12/22 0913   Pulse 95 07/12/22 0908   Resp 23 07/12/22 0908   SpO2 95 % 07/12/22 0908         No case tracking events are documented in the log.      Pain/Robbin Score: Pain Rating Prior to Med Admin: 6 (7/11/2022 12:28 PM)  Pain Rating Post Med Admin: 2 (7/11/2022  1:28 PM)  Robbin Score: 10 (7/12/2022  9:08 AM)

## 2022-07-12 NOTE — ANESTHESIA PREPROCEDURE EVALUATION
"                                                                                                             07/12/2022  Carmen Bryan is a 61 y.o., female   Pre-operative evaluation for Procedure(s) (LRB):  COLON (N/A)    /83 (BP Location: Left arm, Patient Position: Lying)   Pulse 107   Temp 36.7 °C (98.1 °F) (Temporal)   Resp 20   Ht 5' 2.99" (1.6 m)   Wt 93.3 kg (205 lb 11 oz)   SpO2 95%   BMI 36.45 kg/m²     There is no problem list on file for this patient.      Review of patient's allergies indicates:  No Known Allergies    Current Outpatient Medications   Medication Instructions    melatonin oral solution Oral, Nightly       Past Surgical History:   Procedure Laterality Date    CHOLECYSTECTOMY         Social History     Socioeconomic History    Marital status:    Tobacco Use    Smoking status: Never Smoker    Smokeless tobacco: Never Used   Substance and Sexual Activity    Alcohol use: Yes     Alcohol/week: 3.0 standard drinks     Types: 3 Cans of beer per week     Comment: occasionally       Lab Results   Component Value Date    WBC 11.0 07/12/2022    HGB 9.0 (L) 07/12/2022    HCT 30.7 (L) 07/12/2022    MCV 74.7 (L) 07/12/2022     07/12/2022          BMP  Lab Results   Component Value Date     07/12/2022    K 3.2 (L) 07/12/2022    CHLORIDE 109 (H) 07/12/2022    CO2 20 (L) 07/12/2022    GLUCOSE 89 07/12/2022    BUN 6.6 (L) 07/12/2022    CREATININE 0.67 07/12/2022    CALCIUM 8.2 (L) 07/12/2022    EGFRNONAA >60 07/12/2022        INR  No results for input(s): PT, INR, PROTIME, APTT in the last 72 hours.        Diagnostic Studies:      EKG:  Results for orders placed or performed during the hospital encounter of 07/08/22   EKG 12-lead    Collection Time: 07/08/22 10:51 AM    Narrative    Test Reason : R07.9,    Vent. Rate : 084 BPM     Atrial Rate : 084 BPM     P-R Int : 104 ms          QRS Dur : 074 ms      QT Int : 370 ms       P-R-T Axes : 012 035 023 degrees     QTc Int " : 437 ms    Sinus rhythm with short OH  Otherwise normal ECG    Confirmed by Chun LANCE, Sulma ([id number]) on 7/8/2022 4:55:42 PM    Referred By: RYAN   SELF           Confirmed By:Sulma Mccollum MD        Assessment/Plan:     Acute symptomatic anemia due to blood loss  Acute GI bleed  Daily NSAID use  Chronic seasonal allergies     Plan:  - We will transfuse 1 unit of PRBC today.  - EGD revealed questionable AVM that was ablated.  GI suggested colonoscopy and patient wants to get the procedure outpatient.  Cleared for discharge from GI standpoint.  Continue Protonix.  Avoid NSAIDs  - TTE revealed severe TR with pulmonary hypertension, intact EF.  Chest x-ray showed no acute pulmonary findings.  We will add daily Lasix and suggest outpatient cardiology follow-up.  - Incidental enchondroma was noted on x-ray for which radial suggested repeat imaging in 3 months.  - Other home medications were reviewed and renewed.  Continue vitamin D     SCDs  labs        .      Pre-op Assessment          Review of Systems  Anesthesia Hx:  No problems with previous Anesthesia  Denies Family Hx of Anesthesia complications.    Cardiovascular:  Cardiovascular Normal  No Cardiac Complaints   Pulmonary:  Pulmonary Normal No Pulmonary Complaints   Hepatic/GI:   No Current GERD Sx       Physical Exam  General: Alert and Oriented  Obese  Airway:  Mallampati: III   Mouth Opening: Small, but > 3cm  TM Distance: Normal  Tongue: Normal  Neck ROM: Normal ROM    Dental:  Intact    Chest/Lungs:  Clear to auscultation, Normal Respiratory Rate    Heart:  Rate: Normal  Rhythm: Regular Rhythm        Anesthesia Plan  Type of Anesthesia, risks & benefits discussed:    Anesthesia Type: Gen Natural Airway  Intra-op Monitoring Plan: Standard ASA Monitors  Post Op Pain Control Plan: multimodal analgesia  Induction:  IV  Airway Plan: Direct  Informed Consent: Informed consent signed with the Patient and all parties understand the risks and agree with  anesthesia plan.  All questions answered. Patient consented to blood products? No  ASA Score: 3  Day of Surgery Review of History & Physical: H&P Update referred to the surgeon/provider.  Anesthesia Plan Notes: Nasal cannula vs facemask supplemental oxygenation   For patients with CELESTINA/obesity, may consider SuperNoval Nasal CPAP      Ready For Surgery From Anesthesia Perspective.     .

## 2022-07-12 NOTE — PROVATION PATIENT INSTRUCTIONS
Discharge Summary/Instructions after an Endoscopic Procedure  Patient Name: Carmen Bryan  Patient MRN: [id number]  Patient YOB: 1960 Monday, July 11, 2022  Wagner Orta MD  Dear patient,  As a result of recent federal legislation (The Federal Cures Act), you may   receive lab or pathology results from your procedure in your [facility]   account before your physician is able to contact you. Your physician or   their representative will relay the results to you with their   recommendations at their soonest availability.  Thank you,  RESTRICTIONS:  During your procedure today, you received medications for sedation.  These   medications may affect your judgment, balance and coordination.  Therefore,   for 24 hours, you have the following restrictions:   - DO NOT drive a car, operate machinery, make legal/financial decisions,   sign important papers or drink alcohol.    ACTIVITY:  Today: no heavy lifting, straining or running due to procedural   sedation/anesthesia.  The following day: return to full activity including work.  DIET:  Eat and drink normally unless instructed otherwise.     TREATMENT FOR COMMON SIDE EFFECTS:  - Mild abdominal pain, nausea, belching, bloating or excessive gas:  rest,   eat lightly and use a heating pad.  - Sore Throat: treat with throat lozenges and/or gargle with warm salt   water.  - Because air was used during the procedure, expelling large amounts of air   from your rectum or belching is normal.  - If a bowel prep was taken, you may not have a bowel movement for 1-3 days.    This is normal.  SYMPTOMS TO WATCH FOR AND REPORT TO YOUR PHYSICIAN:  1. Abdominal pain or bloating, other than gas cramps.  2. Chest pain.  3. Back pain.  4. Signs of infection such as: chills or fever occurring within 24 hours   after the procedure.  5. Rectal bleeding, which would show as bright red, maroon, or black stools.   (A tablespoon of blood from the rectum is not serious, especially if    hemorrhoids are present.)  6. Vomiting.  7. Weakness or dizziness.  GO DIRECTLY TO THE NEAREST EMERGENCY ROOM IF YOU HAVE ANY OF THE FOLLOWING:      Difficulty breathing              Chills and/or fever over 101 F   Persistent vomiting and/or vomiting blood   Severe abdominal pain   Severe chest pain   Black, tarry stools   Bleeding- more than one tablespoon   Any other symptom or condition that you feel may need urgent attention  Your doctor recommends these additional instructions:  If any biopsies were taken, your doctors clinic will contact you in 1 to 2   weeks with any results.  -Daily ppi and colonoscopy tomorrow to complete the work up  For questions, problems or results please call your physician - Wagner Orta MD at Work:  [phone].  [facility], EMERGENCY ROOM PHONE NUMBER: [phone]  IF A COMPLICATION OR EMERGENCY SITUATION ARISES AND YOU ARE UNABLE TO REACH   YOUR PHYSICIAN - GO DIRECTLY TO THE EMERGENCY ROOM.  MD Wagner Cherry MD  7/11/2022 11:02:42 AM  This report has been verified and signed electronically.  Dear patient,  As a result of recent federal legislation (The Federal Cures Act), you may   receive lab or pathology results from your procedure in your [facility]   account before your physician is able to contact you. Your physician or   their representative will relay the results to you with their   recommendations at their soonest availability.  Thank you,  PROVATION

## 2022-07-12 NOTE — PLAN OF CARE
"   07/12/22 1128   Discharge Assessment   Assessment Type Discharge Planning Assessment   Confirmed/corrected address, phone number and insurance Yes   Confirmed Demographics Correct on Facesheet   Source of Information patient   When was your last doctors appointment?   (Patient reports 2021 was her last doctor visit.)   Communicated EDITH with patient/caregiver Yes   Reason For Admission Symptomatic Anemia   Lives With spouse   Do you expect to return to your current living situation? Yes   Prior to hospitilization cognitive status: Unable to Assess   Current cognitive status: Alert/Oriented   Walking or Climbing Stairs Difficulty none   Dressing/Bathing Difficulty none   Home Accessibility wheelchair accessible   Home Layout Able to live on 1st floor   Equipment Currently Used at Home none   Readmission within 30 days? No   Do you currently have service(s) that help you manage your care at home? No   Do you take prescription medications? No   Do you have prescription coverage? No   Do you have any problems affording any of your prescribed medications? No   Is the patient taking medications as prescribed?   (N/A.)   Who is going to help you get home at discharge? Patient will have support from her daughter, Katty Bryan ([phone])   How do you get to doctors appointments? car, drives self   Are you on dialysis? No   Do you take coumadin? No   Discharge Plan A Home   Discharge Plan B Home with family   DME Needed Upon Discharge  none   Discharge Plan discussed with: Patient   Discharge Barriers Identified None   Relationship/Environment   Name(s) of Who Lives With Patient Spouse: Ramin Bryan.       Patient reports her spouse, Ramin Bryan is currently in the ICU on the 7th floor given the removal of a "large tumor".   Patient's daughter, Katty Bryan ([phone]) is expected to transport patient at discharge. No barriers to discharge at this time.  "

## 2022-07-12 NOTE — PROGRESS NOTES
"Gastroenterology Progress Note    Subjective/Interval History:  Patient tolerated prep overnight with no evidence of blood    ROS:  Review of Systems   Constitutional: Negative for fever, malaise/fatigue and weight loss.   Respiratory: Negative for cough and shortness of breath.    Cardiovascular: Negative for chest pain, palpitations and leg swelling.   Gastrointestinal: Negative for abdominal pain, blood in stool, constipation, diarrhea, heartburn, melena, nausea and vomiting.   Musculoskeletal: Negative for back pain and myalgias.   Skin: Negative for rash.   Neurological: Negative for speech change and focal weakness.   All other systems reviewed and are negative.      Vital Signs:  /71 (BP Location: Left arm, Patient Position: Lying)   Pulse 97   Temp 98.1 °F (36.7 °C) (Temporal)   Resp 18   Ht 5' 2.99" (1.6 m)   Wt 93.3 kg (205 lb 11 oz)   SpO2 (!) 94%   BMI 36.45 kg/m²   Body mass index is 36.45 kg/m².    Physical Exam:  Physical Exam  Vitals reviewed.   Constitutional:       Appearance: Normal appearance.   HENT:      Head: Normocephalic and atraumatic.      Nose: Nose normal.      Mouth/Throat:      Mouth: Mucous membranes are moist.   Cardiovascular:      Rate and Rhythm: Normal rate and regular rhythm.   Pulmonary:      Effort: Pulmonary effort is normal.      Breath sounds: Normal breath sounds.   Abdominal:      General: Abdomen is flat.      Palpations: Abdomen is soft.   Musculoskeletal:         General: Normal range of motion.      Cervical back: Normal range of motion.   Skin:     General: Skin is warm.   Neurological:      General: No focal deficit present.      Mental Status: She is alert.   Psychiatric:         Mood and Affect: Mood normal.         Labs:  Recent Results (from the past 48 hour(s))   Echo    Collection Time: 07/10/22  4:09 PM   Result Value Ref Range    BSA 2.04 m2    TDI SEPTAL 0.12 m/s    LV LATERAL E/E' RATIO 6.40 m/s    LV SEPTAL E/E' RATIO 5.33 m/s    EF 55 %    " Left Ventricular Outflow Tract Mean Velocity 0.51 cm/s    Left Ventricular Outflow Tract Mean Gradient 1.00 mmHg    TDI LATERAL 0.10 m/s    PV PEAK VELOCITY 0.98 cm/s    LVIDd 4.00 (A) 3.5 - 6.0 cm    IVS 1.00 (A) 0.6 - 1.1 cm    Posterior Wall 0.80 (A) 0.6 - 1.1 cm    LVIDs 1.81 (A) 2.1 - 4.0 cm    FS 55 28 - 44 %    LV mass 109.69 g    LA size 2.70 cm    RVDD 2.90 cm    TAPSE 1.84 cm    Left Ventricle Relative Wall Thickness 0.40 cm    AV mean gradient 4 mmHg    AV valve area 1.93 cm2    AV Velocity Ratio 0.54     AV index (prosthetic) 0.61     MV mean gradient 2 mmHg    MV valve area p 1/2 method 2.93 cm2    MV valve area by continuity eq 2.38 cm2    E/A ratio 0.89     Mean e' 0.11 m/s    E wave deceleration time 129 msec    LVOT diameter 2.00 cm    LVOT area 3.1 cm2    LVOT peak azeem 0.75 m/s    LVOT peak VTI 11.90 cm    Ao peak azeem 1.39 m/s    Ao VTI 19.4 cm    LVOT stroke volume 37.37 cm3    AV peak gradient 8 mmHg    MV peak gradient 4 mmHg    E/E' ratio 5.82 m/s    MV Peak E Azeem 0.64 m/s    TR Max Azeem 4.95 m/s    MV VTI 15.7 cm    MV stenosis pressure 1/2 time 75 ms    MV Peak A Azeem 0.72 m/s    LV Systolic Volume 18.12 mL    LV Systolic Volume Index 9.2 mL/m2    LV Diastolic Volume 41.27 mL    LV Diastolic Volume Index 21.06 mL/m2    LV Mass Index 56 g/m2    Triscuspid Valve Regurgitation Peak Gradient 98 mmHg    Right Atrial Pressure (from IVC) 3 mmHg    TV rest pulmonary artery pressure 101 mmHg   CBC with Differential    Collection Time: 07/11/22  5:35 AM   Result Value Ref Range    WBC 13.1 (H) 4.5 - 11.5 x10(3)/mcL    RBC 3.87 (L) 4.20 - 5.40 x10(6)/mcL    Hgb 8.0 (L) 12.0 - 16.0 gm/dL    Hct 27.5 (L) 37.0 - 47.0 %    MCV 71.1 (L) 80.0 - 94.0 fL    MCH 20.7 (L) 27.0 - 31.0 pg    MCHC 29.1 (L) 33.0 - 36.0 mg/dL    RDW 22.2 (H) 11.5 - 17.0 %    Platelet 245 130 - 400 x10(3)/mcL    MPV 9.6 7.4 - 10.4 fL    Neut % 75.4 %    Lymph % 9.5 %    Mono % 12.6 %    Eos % 1.5 %    Basophil % 0.5 %    Lymph # 1.24 0.6  - 4.6 x10(3)/mcL    Neut # 9.9 (H) 2.1 - 9.2 x10(3)/mcL    Mono # 1.65 (H) 0.1 - 1.3 x10(3)/mcL    Eos # 0.20 0 - 0.9 x10(3)/mcL    Baso # 0.06 0 - 0.2 x10(3)/mcL    IG# 0.07 (H) 0 - 0.04 x10(3)/mcL    IG% 0.5 %    NRBC% 0.2 %   Comprehensive Metabolic Panel    Collection Time: 07/11/22  5:35 AM   Result Value Ref Range    Sodium Level 140 136 - 145 mmol/L    Potassium Level 3.5 3.5 - 5.1 mmol/L    Chloride 108 (H) 98 - 107 mmol/L    Carbon Dioxide 20 (L) 22 - 29 mmol/L    Glucose Level 100 74 - 100 mg/dL    Blood Urea Nitrogen 6.2 (L) 9.8 - 20.1 mg/dL    Creatinine 0.70 0.55 - 1.02 mg/dL    Calcium Level Total 8.3 (L) 8.4 - 10.2 mg/dL    Protein Total 5.3 (L) 6.4 - 8.3 gm/dL    Albumin Level 3.0 (L) 3.5 - 5.0 gm/dL    Globulin 2.3 (L) 2.4 - 3.5 gm/dL    Albumin/Globulin Ratio 1.3 1.1 - 2.0 ratio    Bilirubin Total 0.9 <=1.5 mg/dL    Alkaline Phosphatase 64 40 - 150 unit/L    Alanine Aminotransferase 11 0 - 55 unit/L    Aspartate Aminotransferase 16 5 - 34 unit/L    Estimated GFR-Non  >60 mls/min/1.73/m2   Magnesium    Collection Time: 07/11/22  5:35 AM   Result Value Ref Range    Magnesium Level 2.00 1.60 - 2.60 mg/dL   Iron and TIBC    Collection Time: 07/11/22  5:35 AM   Result Value Ref Range    Iron Binding Capacity Unsaturated 337 (H) 70 - 310 ug/dL    Iron Level 13 (L) 50 - 170 ug/dL    Iron Binding Capacity Total 350 250 - 450 ug/dL    Iron Saturation 4 (L) 20 - 50 %   Ferritin    Collection Time: 07/11/22  5:35 AM   Result Value Ref Range    Ferritin Level 29.86 4.63 - 204.00 ng/mL   Vitamin B12    Collection Time: 07/11/22  5:35 AM   Result Value Ref Range    Vitamin B12 Level 689 213 - 816 pg/mL   Folate    Collection Time: 07/12/22  3:15 AM   Result Value Ref Range    Folate Level 12.7 7.0 - 31.4 ng/mL   Comprehensive Metabolic Panel    Collection Time: 07/12/22  3:15 AM   Result Value Ref Range    Sodium Level 141 136 - 145 mmol/L    Potassium Level 3.2 (L) 3.5 - 5.1 mmol/L    Chloride  109 (H) 98 - 107 mmol/L    Carbon Dioxide 20 (L) 22 - 29 mmol/L    Glucose Level 89 74 - 100 mg/dL    Blood Urea Nitrogen 6.6 (L) 9.8 - 20.1 mg/dL    Creatinine 0.67 0.55 - 1.02 mg/dL    Calcium Level Total 8.2 (L) 8.4 - 10.2 mg/dL    Protein Total 5.3 (L) 6.4 - 8.3 gm/dL    Albumin Level 3.0 (L) 3.5 - 5.0 gm/dL    Globulin 2.3 (L) 2.4 - 3.5 gm/dL    Albumin/Globulin Ratio 1.3 1.1 - 2.0 ratio    Bilirubin Total 1.1 <=1.5 mg/dL    Alkaline Phosphatase 62 40 - 150 unit/L    Alanine Aminotransferase 10 0 - 55 unit/L    Aspartate Aminotransferase 15 5 - 34 unit/L    Estimated GFR-Non  >60 mls/min/1.73/m2   CBC with Differential    Collection Time: 07/12/22  3:15 AM   Result Value Ref Range    WBC 11.0 4.5 - 11.5 x10(3)/mcL    RBC 4.11 (L) 4.20 - 5.40 x10(6)/mcL    Hgb 9.0 (L) 12.0 - 16.0 gm/dL    Hct 30.7 (L) 37.0 - 47.0 %    MCV 74.7 (L) 80.0 - 94.0 fL    MCH 21.9 (L) 27.0 - 31.0 pg    MCHC 29.3 (L) 33.0 - 36.0 mg/dL    RDW 23.6 (H) 11.5 - 17.0 %    Platelet 234 130 - 400 x10(3)/mcL    MPV 9.8 7.4 - 10.4 fL    Neut % 70.2 %    Lymph % 13.1 %    Mono % 12.6 %    Eos % 2.9 %    Basophil % 0.7 %    Lymph # 1.44 0.6 - 4.6 x10(3)/mcL    Neut # 7.7 2.1 - 9.2 x10(3)/mcL    Mono # 1.39 (H) 0.1 - 1.3 x10(3)/mcL    Eos # 0.32 0 - 0.9 x10(3)/mcL    Baso # 0.08 0 - 0.2 x10(3)/mcL    IG# 0.05 (H) 0 - 0.04 x10(3)/mcL    IG% 0.5 %    NRBC% 0.0 %   Magnesium    Collection Time: 07/12/22  3:15 AM   Result Value Ref Range    Magnesium Level 1.90 1.60 - 2.60 mg/dL         Assessment/Plan:  Plan for colonoscopy if negative given her stability should likely be okay       Wagner Orta PA-C

## 2022-07-12 NOTE — PROVATION PATIENT INSTRUCTIONS
Discharge Summary/Instructions after an Endoscopic Procedure  Patient Name: Carmen Bryan  Patient MRN: [id number]  Patient YOB: 1960 Tuesday, July 12, 2022  Wagner Orta MD  Dear patient,  As a result of recent federal legislation (The Federal Cures Act), you may   receive lab or pathology results from your procedure in your [facility]   account before your physician is able to contact you. Your physician or   their representative will relay the results to you with their   recommendations at their soonest availability.  Thank you,  RESTRICTIONS:  During your procedure today, you received medications for sedation.  These   medications may affect your judgment, balance and coordination.  Therefore,   for 24 hours, you have the following restrictions:   - DO NOT drive a car, operate machinery, make legal/financial decisions,   sign important papers or drink alcohol.    ACTIVITY:  Today: no heavy lifting, straining or running due to procedural   sedation/anesthesia.  The following day: return to full activity including work.  DIET:  Eat and drink normally unless instructed otherwise.     TREATMENT FOR COMMON SIDE EFFECTS:  - Mild abdominal pain, nausea, belching, bloating or excessive gas:  rest,   eat lightly and use a heating pad.  - Sore Throat: treat with throat lozenges and/or gargle with warm salt   water.  - Because air was used during the procedure, expelling large amounts of air   from your rectum or belching is normal.  - If a bowel prep was taken, you may not have a bowel movement for 1-3 days.    This is normal.  SYMPTOMS TO WATCH FOR AND REPORT TO YOUR PHYSICIAN:  1. Abdominal pain or bloating, other than gas cramps.  2. Chest pain.  3. Back pain.  4. Signs of infection such as: chills or fever occurring within 24 hours   after the procedure.  5. Rectal bleeding, which would show as bright red, maroon, or black stools.   (A tablespoon of blood from the rectum is not serious, especially if    hemorrhoids are present.)  6. Vomiting.  7. Weakness or dizziness.  GO DIRECTLY TO THE NEAREST EMERGENCY ROOM IF YOU HAVE ANY OF THE FOLLOWING:      Difficulty breathing              Chills and/or fever over 101 F   Persistent vomiting and/or vomiting blood   Severe abdominal pain   Severe chest pain   Black, tarry stools   Bleeding- more than one tablespoon   Any other symptom or condition that you feel may need urgent attention  Your doctor recommends these additional instructions:  If any biopsies were taken, your doctors clinic will contact you in 1 to 2   weeks with any results.  -OK to d/c home will recommend VCE to complete the work up  -  Patient has a contact number available for emergencies.  The signs and   symptoms of potential delayed complications were discussed with the   patient.  Return to normal activities tomorrow.  Written discharge   instructions were provided to the patient.   - Resume previous diet.   - Discharge patient to home (ambulatory).   - Continue present medications.   - Repeat colonoscopy in 5 years for surveillance. Repeating in 5 years due   to large amount of bubbles and may have missed small lesions  - Return to GI clinic PRN.  For questions, problems or results please call your physician - Wagner Orta MD at Work:  [phone].  [facility], EMERGENCY ROOM PHONE NUMBER: [phone]  IF A COMPLICATION OR EMERGENCY SITUATION ARISES AND YOU ARE UNABLE TO REACH   YOUR PHYSICIAN - GO DIRECTLY TO THE EMERGENCY ROOM.  MD Wagner Cherry MD  7/12/2022 8:52:15 AM  This report has been verified and signed electronically.  Dear patient,  As a result of recent federal legislation (The Federal Cures Act), you may   receive lab or pathology results from your procedure in your [facility]   account before your physician is able to contact you. Your physician or   their representative will relay the results to you with their   recommendations at their  soonest availability.  Thank you,  PROVATION

## 2022-07-13 ENCOUNTER — PATIENT OUTREACH (OUTPATIENT)
Dept: ADMINISTRATIVE | Facility: CLINIC | Age: 62
End: 2022-07-13
Payer: COMMERCIAL

## 2022-07-13 NOTE — PROGRESS NOTES
C3 nurse spoke with Carmen Bryan for a TCC post hospital discharge follow up call. The patient has a scheduled HOSFU appointment with Dr. Uche Galarza  on 08/30/2022 @ 0830 am.  No hospital follow up appointment noted with Leonardo Trevizo MD.

## 2022-07-14 NOTE — PROGRESS NOTES
C3 nurse spoke with Carmen Bryan regarding TCC post hospital discharge follow up call. Stated she's unable to talk right now and requested call back. The patient has a scheduled [facility] appointment with Dr. Uche Galarza  on 08/30/2022 @ 0830 am.  No hospital follow up appointment noted with Leonardo Trevizo MD.

## 2022-07-15 NOTE — PROGRESS NOTES
C3 nurse spoke with Carmen Bryan regarding TCC post hospital discharge follow up call. Patient's unable to talk right now and declined answering questions.The patient has a scheduled HOSFU appointment with Dr. Uche Galarza  on 08/30/2022 @ 0830 am.  No hospital follow up appointment noted with Leonardo Trevizo MD.

## 2023-09-03 ENCOUNTER — OFFICE VISIT (OUTPATIENT)
Dept: URGENT CARE | Facility: CLINIC | Age: 63
End: 2023-09-03
Payer: COMMERCIAL

## 2023-09-03 VITALS
BODY MASS INDEX: 34.38 KG/M2 | OXYGEN SATURATION: 96 % | WEIGHT: 194 LBS | TEMPERATURE: 98 F | DIASTOLIC BLOOD PRESSURE: 78 MMHG | HEIGHT: 63 IN | HEART RATE: 83 BPM | SYSTOLIC BLOOD PRESSURE: 115 MMHG

## 2023-09-03 DIAGNOSIS — J32.9 BACTERIAL SINUSITIS: Primary | ICD-10-CM

## 2023-09-03 DIAGNOSIS — B96.89 BACTERIAL SINUSITIS: Primary | ICD-10-CM

## 2023-09-03 PROCEDURE — 99213 PR OFFICE/OUTPT VISIT, EST, LEVL III, 20-29 MIN: ICD-10-PCS | Mod: ,,, | Performed by: FAMILY MEDICINE

## 2023-09-03 PROCEDURE — 99213 OFFICE O/P EST LOW 20 MIN: CPT | Mod: ,,, | Performed by: FAMILY MEDICINE

## 2023-09-03 RX ORDER — AMOXICILLIN AND CLAVULANATE POTASSIUM 875; 125 MG/1; MG/1
1 TABLET, FILM COATED ORAL EVERY 12 HOURS
Qty: 14 TABLET | Refills: 0 | Status: SHIPPED | OUTPATIENT
Start: 2023-09-03 | End: 2023-09-10

## 2023-09-03 RX ORDER — ALPRAZOLAM 0.25 MG/1
0.25 TABLET ORAL
COMMUNITY
Start: 2023-04-24

## 2023-09-03 RX ORDER — DOCUSATE SODIUM 100 MG/1
100 CAPSULE, LIQUID FILLED ORAL
COMMUNITY
Start: 2023-08-03

## 2023-09-03 RX ORDER — PREDNISONE 20 MG/1
40 TABLET ORAL DAILY
Qty: 8 TABLET | Refills: 0 | Status: SHIPPED | OUTPATIENT
Start: 2023-09-03 | End: 2023-09-07

## 2023-09-03 RX ORDER — CETIRIZINE HYDROCHLORIDE 10 MG/1
10 TABLET ORAL
COMMUNITY
Start: 2023-08-03

## 2023-09-03 RX ORDER — FERROUS SULFATE 324(65)MG
324 TABLET, DELAYED RELEASE (ENTERIC COATED) ORAL
COMMUNITY
Start: 2023-08-03

## 2023-09-03 RX ORDER — PANTOPRAZOLE SODIUM 40 MG/1
40 TABLET, DELAYED RELEASE ORAL
COMMUNITY
Start: 2023-08-03

## 2023-09-03 RX ORDER — FUROSEMIDE 20 MG/1
20 TABLET ORAL
COMMUNITY
Start: 2023-08-03

## 2023-09-03 NOTE — PATIENT INSTRUCTIONS
Please take Augmentin twice daily for 7 days    Please take prednisone once daily for up to 4 days    Please return or otherwise seek medical attention if symptoms change, worsen, or do not resolve within one-week

## 2024-10-25 NOTE — OP NOTE
Patient with normal colonoscopy  Given her blood counts are stable  She can be DC home with oral iron replacement  Follow-up CBC next week recommend outpatient video capsule endoscopy to complete workup symptomatic anemia essentially negative EGD with minimal contains    No further GI recs   Bimzelx Counseling:  I discussed with the patient the risks of Bimzelx including but not limited to depression, immunosuppression, allergic reactions and infections.  The patient understands that monitoring is required including a PPD at baseline and must alert us or the primary physician if symptoms of infection or other concerning signs are noted. Cephalexin Counseling: I counseled the patient regarding use of cephalexin as an antibiotic for prophylactic and/or therapeutic purposes. Cephalexin (commonly prescribed under brand name Keflex) is a cephalosporin antibiotic which is active against numerous classes of bacteria, including most skin bacteria. Side effects may include nausea, diarrhea, gastrointestinal upset, rash, hives, yeast infections, and in rare cases, hepatitis, kidney disease, seizures, fever, confusion, neurologic symptoms, and others. Patients with severe allergies to penicillin medications are cautioned that there is about a 10% incidence of cross-reactivity with cephalosporins. When possible, patients with penicillin allergies should use alternatives to cephalosporins for antibiotic therapy. Xeljanz Counseling: I discussed with the patient the risks of Xeljanz therapy including increased risk of infection, liver issues, headache, diarrhea, or cold symptoms. Live vaccines should be avoided. They were instructed to call if they have any problems. Enbrel Pregnancy And Lactation Text: This medication is Pregnancy Category B and is considered safe during pregnancy. It is unknown if this medication is excreted in breast milk. Cibinqo Pregnancy And Lactation Text: It is unknown if this medication will adversely affect pregnancy or breast feeding.  You should not take this medication if you are currently pregnant or planning a pregnancy or while breastfeeding. Oxybutynin Pregnancy And Lactation Text: This medication is Pregnancy Category B and is considered safe during pregnancy. It is unknown if it is excreted in breast milk. Winlevi Pregnancy And Lactation Text: This medication is considered safe during pregnancy and breastfeeding. Finasteride Male Counseling: Finasteride Counseling:  I discussed with the patient the risks of use of finasteride including but not limited to decreased libido, decreased ejaculate volume, gynecomastia, and depression. Women should not handle medication.  All of the patient's questions and concerns were addressed. Carac Pregnancy And Lactation Text: This medication is Pregnancy Category X and contraindicated in pregnancy and in women who may become pregnant. It is unknown if this medication is excreted in breast milk. Nsaids Counseling: NSAID Counseling: I discussed with the patient that NSAIDs should be taken with food. Prolonged use of NSAIDs can result in the development of stomach ulcers.  Patient advised to stop taking NSAIDs if abdominal pain occurs.  The patient verbalized understanding of the proper use and possible adverse effects of NSAIDs.  All of the patient's questions and concerns were addressed. Topical Metronidazole Counseling: Metronidazole is a topical antibiotic medication. You may experience burning, stinging, redness, or allergic reactions.  Please call our office if you develop any problems from using this medication. High Dose Vitamin A Pregnancy And Lactation Text: High dose vitamin A therapy is contraindicated during pregnancy and breast feeding. Qbrexza Counseling:  I discussed with the patient the risks of Qbrexza including but not limited to headache, mydriasis, blurred vision, dry eyes, nasal dryness, dry mouth, dry throat, dry skin, urinary hesitation, and constipation.  Local skin reactions including erythema, burning, stinging, and itching can also occur. Cellcept Counseling:  I discussed with the patient the risks of mycophenolate mofetil including but not limited to infection/immunosuppression, GI upset, hypokalemia, hypercholesterolemia, bone marrow suppression, lymphoproliferative disorders, malignancy, GI ulceration/bleed/perforation, colitis, interstitial lung disease, kidney failure, progressive multifocal leukoencephalopathy, and birth defects.  The patient understands that monitoring is required including a baseline creatinine and regular CBC testing. In addition, patient must alert us immediately if symptoms of infection or other concerning signs are noted. Tetracycline Pregnancy And Lactation Text: This medication is Pregnancy Category D and not consider safe during pregnancy. It is also excreted in breast milk. Stelara Counseling:  I discussed with the patient the risks of ustekinumab including but not limited to immunosuppression, malignancy, posterior leukoencephalopathy syndrome, and serious infections.  The patient understands that monitoring is required including a PPD at baseline and must alert us or the primary physician if symptoms of infection or other concerning signs are noted. Minocycline Counseling: Patient advised regarding possible photosensitivity and discoloration of the teeth, skin, lips, tongue and gums.  Patient instructed to avoid sunlight, if possible.  When exposed to sunlight, patients should wear protective clothing, sunglasses, and sunscreen.  The patient was instructed to call the office immediately if the following severe adverse effects occur:  hearing changes, easy bruising/bleeding, severe headache, or vision changes.  The patient verbalized understanding of the proper use and possible adverse effects of minocycline.  All of the patient's questions and concerns were addressed. Rituxan Pregnancy And Lactation Text: This medication is Pregnancy Category C and it isn't know if it is safe during pregnancy. It is unknown if this medication is excreted in breast milk but similar antibodies are known to be excreted. Albendazole Counseling:  I discussed with the patient the risks of albendazole including but not limited to cytopenia, kidney damage, nausea/vomiting and severe allergy.  The patient understands that this medication is being used in an off-label manner. Arava Counseling:  Patient counseled regarding adverse effects of Arava including but not limited to nausea, vomiting, abnormalities in liver function tests. Patients may develop mouth sores, rash, diarrhea, and abnormalities in blood counts. The patient understands that monitoring is required including LFTs and blood counts.  There is a rare possibility of scarring of the liver and lung problems that can occur when taking methotrexate. Persistent nausea, loss of appetite, pale stools, dark urine, cough, and shortness of breath should be reported immediately. Patient advised to discontinue Arava treatment and consult with a physician prior to attempting conception. The patient will have to undergo a treatment to eliminate Arava from the body prior to conception. Soolantra Pregnancy And Lactation Text: This medication is Pregnancy Category C. This medication is considered safe during breast feeding. Terbinafine Counseling: Patient counseling regarding adverse effects of terbinafine including but not limited to headache, diarrhea, rash, upset stomach, liver function test abnormalities, itching, taste/smell disturbance, nausea, abdominal pain, and flatulence.  There is a rare possibility of liver failure that can occur when taking terbinafine.  The patient understands that a baseline LFT and kidney function test may be required. The patient verbalized understanding of the proper use and possible adverse effects of terbinafine.  All of the patient's questions and concerns were addressed. Erivedge Pregnancy And Lactation Text: This medication is Pregnancy Category X and is absolutely contraindicated during pregnancy. It is unknown if it is excreted in breast milk. Gabapentin Pregnancy And Lactation Text: This medication is Pregnancy Category C and isn't considered safe during pregnancy. It is excreted in breast milk. Minoxidil Pregnancy And Lactation Text: This medication has not been assigned a Pregnancy Risk Category but animal studies failed to show danger with the topical medication. It is unknown if the medication is excreted in breast milk. VTAMA Counseling: I discussed with the patient that VTAMA is not for use in the eyes, mouth or mouth. They should call the office if they develop any signs of allergic reactions to VTAMA. The patient verbalized understanding of the proper use and possible adverse effects of VTAMA.  All of the patient's questions and concerns were addressed. Xelmarleyz Pregnancy And Lactation Text: This medication is Pregnancy Category D and is not considered safe during pregnancy.  The risk during breast feeding is also uncertain. Eucrisa Counseling: Patient may experience a mild burning sensation during topical application. Eucrisa is not approved in children less than 2 years of age. Bimzelx Pregnancy And Lactation Text: This medication crosses the placenta and the safety is uncertain during pregnancy. It is unknown if this medication is present in breast milk. Calcipotriene Counseling:  I discussed with the patient the risks of calcipotriene including but not limited to erythema, scaling, itching, and irritation. Finasteride Female Counseling: Finasteride Counseling:  I discussed with the patient the risks of use of finasteride including but not limited to decreased libido and sexual dysfunction. Explained the teratogenic nature of the medication and stressed the importance of not getting pregnant during treatment. All of the patient's questions and concerns were addressed. Humira Counseling:  I discussed with the patient the risks of adalimumab including but not limited to myelosuppression, immunosuppression, autoimmune hepatitis, demyelinating diseases, lymphoma, and serious infections.  The patient understands that monitoring is required including a PPD at baseline and must alert us or the primary physician if symptoms of infection or other concerning signs are noted. Cephalexin Pregnancy And Lactation Text: This medication is Pregnancy Category B and considered safe during pregnancy.  It is also excreted in breast milk but can be used safely for shorter doses. Detail Level: Simple Nsaids Pregnancy And Lactation Text: These medications are considered safe up to 30 weeks gestation. It is excreted in breast milk. Topical Metronidazole Pregnancy And Lactation Text: This medication is Pregnancy Category B and considered safe during pregnancy.  It is also considered safe to use while breastfeeding. Litfulo Counseling: I discussed with the patient the risks of Litfulo therapy including but not limited to upper respiratory tract infections, shingles, cold sores, and nausea. Live vaccines should be avoided.  This medication has been linked to serious infections; higher rate of mortality; malignancy and lymphoproliferative disorders; major adverse cardiovascular events; thrombosis; gastrointestinal perforations; neutropenia; lymphopenia; anemia; liver enzyme elevations; and lipid elevations. Cellcept Pregnancy And Lactation Text: This medication is Pregnancy Category D and isn't considered safe during pregnancy. It is unknown if this medication is excreted in breast milk. Propranolol Counseling:  I discussed with the patient the risks of propranolol including but not limited to low heart rate, low blood pressure, low blood sugar, restlessness and increased cold sensitivity. They should call the office if they experience any of these side effects. Topical Retinoid counseling:  Patient advised to apply a pea-sized amount only at bedtime and wait 30 minutes after washing their face before applying.  If too drying, patient may add a non-comedogenic moisturizer. The patient verbalized understanding of the proper use and possible adverse effects of retinoids.  All of the patient's questions and concerns were addressed. Albendazole Pregnancy And Lactation Text: This medication is Pregnancy Category C and it isn't known if it is safe during pregnancy. It is also excreted in breast milk. Libtayo Counseling- I discussed with the patient the risks of Libtayo including but not limited to nausea, vomiting, diarrhea, and bone or muscle pain.  The patient verbalized understanding of the proper use and possible adverse effects of Libtayo.  All of the patient's questions and concerns were addressed. Qbrexza Pregnancy And Lactation Text: There is no available data on Qbrexza use in pregnant women.  There is no available data on Qbrexza use in lactation. Glycopyrrolate Counseling:  I discussed with the patient the risks of glycopyrrolate including but not limited to skin rash, drowsiness, dry mouth, difficulty urinating, and blurred vision. Mirvaso Counseling: Mirvaso is a topical medication which can decrease superficial blood flow where applied. Side effects are uncommon and include stinging, redness and allergic reactions. Finasteride Pregnancy And Lactation Text: This medication is absolutely contraindicated during pregnancy. It is unknown if it is excreted in breast milk. Terbinafine Pregnancy And Lactation Text: This medication is Pregnancy Category B and is considered safe during pregnancy. It is also excreted in breast milk and breast feeding isn't recommended. Siliq Counseling:  I discussed with the patient the risks of Siliq including but not limited to new or worsening depression, suicidal thoughts and behavior, immunosuppression, malignancy, posterior leukoencephalopathy syndrome, and serious infections.  The patient understands that monitoring is required including a PPD at baseline and must alert us or the primary physician if symptoms of infection or other concerning signs are noted. There is also a special program designed to monitor depression which is required with Siliq. Fluconazole Counseling:  Patient counseled regarding adverse effects of fluconazole including but not limited to headache, diarrhea, nausea, upset stomach, liver function test abnormalities, taste disturbance, and stomach pain.  There is a rare possibility of liver failure that can occur when taking fluconazole.  The patient understands that monitoring of LFTs and kidney function test may be required, especially at baseline. The patient verbalized understanding of the proper use and possible adverse effects of fluconazole.  All of the patient's questions and concerns were addressed. Vtama Pregnancy And Lactation Text: It is unknown if this medication can cause problems during pregnancy and breastfeeding. Propranolol Pregnancy And Lactation Text: This medication is Pregnancy Category C and it isn't known if it is safe during pregnancy. It is excreted in breast milk. Cyclophosphamide Counseling:  I discussed with the patient the risks of cyclophosphamide including but not limited to hair loss, hormonal abnormalities, decreased fertility, abdominal pain, diarrhea, nausea and vomiting, bone marrow suppression and infection. The patient understands that monitoring is required while taking this medication. Calcipotriene Pregnancy And Lactation Text: The use of this medication during pregnancy or lactation is not recommended as there is insufficient data. Clindamycin Counseling: I counseled the patient regarding use of clindamycin as an antibiotic for prophylactic and/or therapeutic purposes. Clindamycin is active against numerous classes of bacteria, including skin bacteria. Side effects may include nausea, diarrhea, gastrointestinal upset, rash, hives, yeast infections, and in rare cases, colitis. Aklief counseling:  Patient advised to apply a pea-sized amount only at bedtime and wait 30 minutes after washing their face before applying.  If too drying, patient may add a non-comedogenic moisturizer.  The most commonly reported side effects including irritation, redness, scaling, dryness, stinging, burning, itching, and increased risk of sunburn.  The patient verbalized understanding of the proper use and possible adverse effects of retinoids.  All of the patient's questions and concerns were addressed. Include Pregnancy/Lactation Warning?: No Taltz Counseling: I discussed with the patient the risks of ixekizumab including but not limited to immunosuppression, serious infections, worsening of inflammatory bowel disease and drug reactions.  The patient understands that monitoring is required including a PPD at baseline and must alert us or the primary physician if symptoms of infection or other concerning signs are noted. Glycopyrrolate Pregnancy And Lactation Text: This medication is Pregnancy Category B and is considered safe during pregnancy. It is unknown if it is excreted breast milk. Topical Retinoid Pregnancy And Lactation Text: This medication is Pregnancy Category C. It is unknown if this medication is excreted in breast milk. Litfulo Pregnancy And Lactation Text: Based on animal studies, Lifulo may cause embryo-fetal harm when administered to pregnant women.  The medication should not be used in pregnancy.  Breastfeeding is not recommended during treatment. Cimzia Counseling:  I discussed with the patient the risks of Cimzia including but not limited to immunosuppression, allergic reactions and infections.  The patient understands that monitoring is required including a PPD at baseline and must alert us or the primary physician if symptoms of infection or other concerning signs are noted. Topical Steroids Counseling: I discussed with the patient that prolonged use of topical steroids can result in the increased appearance of superficial blood vessels (telangiectasias), lightening (hypopigmentation) and thinning of the skin (atrophy).  Patient understands to avoid using high potency steroids in skin folds, the groin or the face.  The patient verbalized understanding of the proper use and possible adverse effects of topical steroids.  All of the patient's questions and concerns were addressed. Ivermectin Counseling:  Patient instructed to take medication on an empty stomach with a full glass of water.  Patient informed of potential adverse effects including but not limited to nausea, diarrhea, dizziness, itching, and swelling of the extremities or lymph nodes.  The patient verbalized understanding of the proper use and possible adverse effects of ivermectin.  All of the patient's questions and concerns were addressed. Rhofade Counseling: Rhofade is a topical medication which can decrease superficial blood flow where applied. Side effects are uncommon and include stinging, redness and allergic reactions. Olanzapine Counseling- I discussed with the patient the common side effects of olanzapine including but are not limited to: lack of energy, dry mouth, increased appetite, sleepiness, tremor, constipation, dizziness, changes in behavior, or restlessness.  Explained that teenagers are more likely to experience headaches, abdominal pain, pain in the arms or legs, tiredness, and sleepiness.  Serious side effects include but are not limited: increased risk of death in elderly patients who are confused, have memory loss, or dementia-related psychosis; hyperglycemia; increased cholesterol and triglycerides; and weight gain. Mirvaso Pregnancy And Lactation Text: This medication has not been assigned a Pregnancy Risk Category. It is unknown if the medication is excreted in breast milk. Libtayo Pregnancy And Lactation Text: This medication is contraindicated in pregnancy and when breast feeding. Acitretin Counseling:  I discussed with the patient the risks of acitretin including but not limited to hair loss, dry lips/skin/eyes, liver damage, hyperlipidemia, depression/suicidal ideation, photosensitivity.  Serious rare side effects can include but are not limited to pancreatitis, pseudotumor cerebri, bony changes, clot formation/stroke/heart attack.  Patient understands that alcohol is contraindicated since it can result in liver toxicity and significantly prolong the elimination of the drug by many years. Hyrimoz Counseling:  I discussed with the patient the risks of adalimumab including but not limited to myelosuppression, immunosuppression, autoimmune hepatitis, demyelinating diseases, lymphoma, and serious infections.  The patient understands that monitoring is required including a PPD at baseline and must alert us or the primary physician if symptoms of infection or other concerning signs are noted. Hydroquinone Counseling:  Patient advised that medication may result in skin irritation, lightening (hypopigmentation), dryness, and burning.  In the event of skin irritation, the patient was advised to reduce the amount of the drug applied or use it less frequently.  Rarely, spots that are treated with hydroquinone can become darker (pseudoochronosis).  Should this occur, patient instructed to stop medication and call the office. The patient verbalized understanding of the proper use and possible adverse effects of hydroquinone.  All of the patient's questions and concerns were addressed. Thalidomide Counseling: I discussed with the patient the risks of thalidomide including but not limited to birth defects, anxiety, weakness, chest pain, dizziness, cough and severe allergy. Siliq Pregnancy And Lactation Text: The risk during pregnancy and breastfeeding is uncertain with this medication. Quinolones Counseling:  I discussed with the patient the risks of fluoroquinolones including but not limited to GI upset, allergic reaction, drug rash, diarrhea, dizziness, photosensitivity, yeast infections, liver function test abnormalities, tendonitis/tendon rupture. Clindamycin Pregnancy And Lactation Text: This medication can be used in pregnancy if certain situations. Clindamycin is also present in breast milk. SSKI Counseling:  I discussed with the patient the risks of SSKI including but not limited to thyroid abnormalities, metallic taste, GI upset, fever, headache, acne, arthralgias, paraesthesias, lymphadenopathy, easy bleeding, arrhythmias, and allergic reaction. Birth Control Pills Counseling: Birth Control Pill Counseling: I discussed with the patient the potential side effects of OCPs including but not limited to increased risk of stroke, heart attack, thrombophlebitis, deep venous thrombosis, hepatic adenomas, breast changes, GI upset, headaches, and depression.  The patient verbalized understanding of the proper use and possible adverse effects of OCPs. All of the patient's questions and concerns were addressed. Zoryve Counseling:  I discussed with the patient that Zoryve is not for use in the eyes, mouth or vagina. The most commonly reported side effects include diarrhea, headache, insomnia, application site pain, upper respiratory tract infections, and urinary tract infections.  All of the patient's questions and concerns were addressed. Cimetidine Counseling:  I discussed with the patient the risks of Cimetidine including but not limited to gynecomastia, headache, diarrhea, nausea, drowsiness, arrhythmias, pancreatitis, skin rashes, psychosis, bone marrow suppression and kidney toxicity. Fluconazole Pregnancy And Lactation Text: This medication is Pregnancy Category C and it isn't know if it is safe during pregnancy. It is also excreted in breast milk. Clofazimine Counseling:  I discussed with the patient the risks of clofazimine including but not limited to skin and eye pigmentation, liver damage, nausea/vomiting, gastrointestinal bleeding and allergy. Topical Steroids Applications Pregnancy And Lactation Text: Most topical steroids are considered safe to use during pregnancy and lactation.  Any topical steroid applied to the breast or nipple should be washed off before breastfeeding. Olumiant Counseling: I discussed with the patient the risks of Olumiant therapy including but not limited to upper respiratory tract infections, shingles, cold sores, and nausea. Live vaccines should be avoided.  This medication has been linked to serious infections; higher rate of mortality; malignancy and lymphoproliferative disorders; major adverse cardiovascular events; thrombosis; gastrointestinal perforations; neutropenia; lymphopenia; anemia; liver enzyme elevations; and lipid elevations. Cantharidin Counseling:  I discussed with the patient the risks of Cantharidin including but not limited to pain, redness, burning, itching, and blistering. Olanzapine Pregnancy And Lactation Text: This medication is pregnancy category C.   There are no adequate and well controlled trials with olanzapine in pregnant females.  Olanzapine should be used during pregnancy only if the potential benefit justifies the potential risk to the fetus.   In a study in lactating healthy women, olanzapine was excreted in breast milk.  It is recommended that women taking olanzapine should not breast feed. Cyclophosphamide Pregnancy And Lactation Text: This medication is Pregnancy Category D and it isn't considered safe during pregnancy. This medication is excreted in breast milk. Aklief Pregnancy And Lactation Text: It is unknown if this medication is safe to use during pregnancy.  It is unknown if this medication is excreted in breast milk.  Breastfeeding women should use the topical cream on the smallest area of the skin for the shortest time needed while breastfeeding.  Do not apply to nipple and areola. Cimzia Pregnancy And Lactation Text: This medication crosses the placenta but can be considered safe in certain situations. Cimzia may be excreted in breast milk. Simponi Counseling:  I discussed with the patient the risks of golimumab including but not limited to myelosuppression, immunosuppression, autoimmune hepatitis, demyelinating diseases, lymphoma, and serious infections.  The patient understands that monitoring is required including a PPD at baseline and must alert us or the primary physician if symptoms of infection or other concerning signs are noted. Hydroxychloroquine Counseling:  I discussed with the patient that a baseline ophthalmologic exam is needed at the start of therapy and every year thereafter while on therapy. A CBC may also be warranted for monitoring.  The side effects of this medication were discussed with the patient, including but not limited to agranulocytosis, aplastic anemia, seizures, rashes, retinopathy, and liver toxicity. Patient instructed to call the office should any adverse effect occur.  The patient verbalized understanding of the proper use and possible adverse effects of Plaquenil.  All the patient's questions and concerns were addressed. Cantharidin Pregnancy And Lactation Text: This medication has not been proven safe during pregnancy. It is unknown if this medication is excreted in breast milk. Tazorac Counseling:  Patient advised that medication is irritating and drying.  Patient may need to apply sparingly and wash off after an hour before eventually leaving it on overnight.  The patient verbalized understanding of the proper use and possible adverse effects of tazorac.  All of the patient's questions and concerns were addressed. Acitretin Pregnancy And Lactation Text: This medication is Pregnancy Category X and should not be given to women who are pregnant or may become pregnant in the future. This medication is excreted in breast milk. Opzelura Counseling:  I discussed with the patient the risks of Opzelura including but not limited to nasopharngitis, bronchitis, ear infection, eosinophila, hives, diarrhea, folliculitis, tonsillitis, and rhinorrhea.  Taken orally, this medication has been linked to serious infections; higher rate of mortality; malignancy and lymphoproliferative disorders; major adverse cardiovascular events; thrombosis; thrombocytopenia, anemia, and neutropenia; and lipid elevations. Odomzo Counseling- I discussed with the patient the risks of Odomzo including but not limited to nausea, vomiting, diarrhea, constipation, weight loss, changes in the sense of taste, decreased appetite, muscle spasms, and hair loss.  The patient verbalized understanding of the proper use and possible adverse effects of Odomzo.  All of the patient's questions and concerns were addressed. Methotrexate Counseling:  Patient counseled regarding adverse effects of methotrexate including but not limited to nausea, vomiting, abnormalities in liver function tests. Patients may develop mouth sores, rash, diarrhea, and abnormalities in blood counts. The patient understands that monitoring is required including LFT's and blood counts.  There is a rare possibility of scarring of the liver and lung problems that can occur when taking methotrexate. Persistent nausea, loss of appetite, pale stools, dark urine, cough, and shortness of breath should be reported immediately. Patient advised to discontinue methotrexate treatment at least three months before attempting to become pregnant.  I discussed the need for folate supplements while taking methotrexate.  These supplements can decrease side effects during methotrexate treatment. The patient verbalized understanding of the proper use and possible adverse effects of methotrexate.  All of the patient's questions and concerns were addressed. Doxycycline Counseling:  Patient counseled regarding possible photosensitivity and increased risk for sunburn.  Patient instructed to avoid sunlight, if possible.  When exposed to sunlight, patients should wear protective clothing, sunglasses, and sunscreen.  The patient was instructed to call the office immediately if the following severe adverse effects occur:  hearing changes, easy bruising/bleeding, severe headache, or vision changes.  The patient verbalized understanding of the proper use and possible adverse effects of doxycycline.  All of the patient's questions and concerns were addressed. Cosentyx Counseling:  I discussed with the patient the risks of Cosentyx including but not limited to worsening of Crohn's disease, immunosuppression, allergic reactions and infections.  The patient understands that monitoring is required including a PPD at baseline and must alert us or the primary physician if symptoms of infection or other concerning signs are noted. Topical Sulfur Applications Counseling: Topical Sulfur Counseling: Patient counseled that this medication may cause skin irritation or allergic reactions.  In the event of skin irritation, the patient was advised to reduce the amount of the drug applied or use it less frequently.   The patient verbalized understanding of the proper use and possible adverse effects of topical sulfur application.  All of the patient's questions and concerns were addressed. Griseofulvin Counseling:  I discussed with the patient the risks of griseofulvin including but not limited to photosensitivity, cytopenia, liver damage, nausea/vomiting and severe allergy.  The patient understands that this medication is best absorbed when taken with a fatty meal (e.g., ice cream or french fries). Olumiant Pregnancy And Lactation Text: Based on animal studies, Olumiant may cause embryo-fetal harm when administered to pregnant women.  The medication should not be used in pregnancy.  Breastfeeding is not recommended during treatment. Birth Control Pills Pregnancy And Lactation Text: This medication should be avoided if pregnant and for the first 30 days post-partum. Sski Pregnancy And Lactation Text: This medication is Pregnancy Category D and isn't considered safe during pregnancy. It is excreted in breast milk. 5-Fu Counseling: 5-Fluorouracil Counseling:  I discussed with the patient the risks of 5-fluorouracil including but not limited to erythema, scaling, itching, weeping, crusting, and pain. Oral Minoxidil Counseling- I discussed with the patient the risks of oral minoxidil including but not limited to shortness of breath, swelling of the feet or ankles, dizziness, lightheadedness, unwanted hair growth and allergic reaction.  The patient verbalized understanding of the proper use and possible adverse effects of oral minoxidil.  All of the patient's questions and concerns were addressed. Azelaic Acid Counseling: Patient counseled that medicine may cause skin irritation and to avoid applying near the eyes.  In the event of skin irritation, the patient was advised to reduce the amount of the drug applied or use it less frequently.   The patient verbalized understanding of the proper use and possible adverse effects of azelaic acid.  All of the patient's questions and concerns were addressed. Cyclosporine Counseling:  I discussed with the patient the risks of cyclosporine including but not limited to hypertension, gingival hyperplasia,myelosuppression, immunosuppression, liver damage, kidney damage, neurotoxicity, lymphoma, and serious infections. The patient understands that monitoring is required including baseline blood pressure, CBC, CMP, lipid panel and uric acid, and then 1-2 times monthly CMP and blood pressure. Bexarotene Counseling:  I discussed with the patient the risks of bexarotene including but not limited to hair loss, dry lips/skin/eyes, liver abnormalities, hyperlipidemia, pancreatitis, depression/suicidal ideation, photosensitivity, drug rash/allergic reactions, hypothyroidism, anemia, leukopenia, infection, cataracts, and teratogenicity.  Patient understands that they will need regular blood tests to check lipid profile, liver function tests, white blood cell count, thyroid function tests and pregnancy test if applicable. Opzelura Pregnancy And Lactation Text: There is insufficient data to evaluate drug-associated risk for major birth defects, miscarriage, or other adverse maternal or fetal outcomes.  There is a pregnancy registry that monitors pregnancy outcomes in pregnant persons exposed to the medication during pregnancy.  It is unknown if this medication is excreted in breast milk.  Do not breastfeed during treatment and for about 4 weeks after the last dose. Tremfya Counseling: I discussed with the patient the risks of guselkumab including but not limited to immunosuppression, serious infections, and drug reactions.  The patient understands that monitoring is required including a PPD at baseline and must alert us or the primary physician if symptoms of infection or other concerning signs are noted. Rifampin Counseling: I discussed with the patient the risks of rifampin including but not limited to liver damage, kidney damage, red-orange body fluids, nausea/vomiting and severe allergy. Colchicine Counseling:  Patient counseled regarding adverse effects including but not limited to stomach upset (nausea, vomiting, stomach pain, or diarrhea).  Patient instructed to limit alcohol consumption while taking this medication.  Colchicine may reduce blood counts especially with prolonged use.  The patient understands that monitoring of kidney function and blood counts may be required, especially at baseline. The patient verbalized understanding of the proper use and possible adverse effects of colchicine.  All of the patient's questions and concerns were addressed. Doxycycline Pregnancy And Lactation Text: This medication is Pregnancy Category D and not consider safe during pregnancy. It is also excreted in breast milk but is considered safe for shorter treatment courses. Tranexamic Acid Counseling:  Patient advised of the small risk of bleeding problems with tranexamic acid. They were also instructed to call if they developed any nausea, vomiting or diarrhea. All of the patient's questions and concerns were addressed. Tazorac Pregnancy And Lactation Text: This medication is not safe during pregnancy. It is unknown if this medication is excreted in breast milk. Hydroxychloroquine Pregnancy And Lactation Text: This medication has been shown to cause fetal harm but it isn't assigned a Pregnancy Risk Category. There are small amounts excreted in breast milk. Zyclara Counseling:  I discussed with the patient the risks of imiquimod including but not limited to erythema, scaling, itching, weeping, crusting, and pain.  Patient understands that the inflammatory response to imiquimod is variable from person to person and was educated regarded proper titration schedule.  If flu-like symptoms develop, patient knows to discontinue the medication and contact us. Griseofulvin Pregnancy And Lactation Text: This medication is Pregnancy Category X and is known to cause serious birth defects. It is unknown if this medication is excreted in breast milk but breast feeding should be avoided. Imiquimod Counseling:  I discussed with the patient the risks of imiquimod including but not limited to erythema, scaling, itching, weeping, crusting, and pain.  Patient understands that the inflammatory response to imiquimod is variable from person to person and was educated regarded proper titration schedule.  If flu-like symptoms develop, patient knows to discontinue the medication and contact us. Methotrexate Pregnancy And Lactation Text: This medication is Pregnancy Category X and is known to cause fetal harm. This medication is excreted in breast milk. Spironolactone Counseling: Patient advised regarding risks of diarrhea, abdominal pain, hyperkalemia, birth defects (for female patients), liver toxicity and renal toxicity. The patient may need blood work to monitor liver and kidney function and potassium levels while on therapy. The patient verbalized understanding of the proper use and possible adverse effects of spironolactone.  All of the patient's questions and concerns were addressed. Ilumya Counseling: I discussed with the patient the risks of tildrakizumab including but not limited to immunosuppression, malignancy, posterior leukoencephalopathy syndrome, and serious infections.  The patient understands that monitoring is required including a PPD at baseline and must alert us or the primary physician if symptoms of infection or other concerning signs are noted. Azithromycin Counseling:  I discussed with the patient the risks of azithromycin including but not limited to GI upset, allergic reaction, drug rash, diarrhea, and yeast infections. Doxepin Counseling:  Patient advised that the medication is sedating and not to drive a car after taking this medication. Patient informed of potential adverse effects including but not limited to dry mouth, urinary retention, and blurry vision.  The patient verbalized understanding of the proper use and possible adverse effects of doxepin.  All of the patient's questions and concerns were addressed. Rinvoq Counseling: I discussed with the patient the risks of Rinvoq therapy including but not limited to upper respiratory tract infections, shingles, cold sores, bronchitis, nausea, cough, fever, acne, and headache. Live vaccines should be avoided.  This medication has been linked to serious infections; higher rate of mortality; malignancy and lymphoproliferative disorders; major adverse cardiovascular events; thrombosis; thrombocytopenia, anemia, and neutropenia; lipid elevations; liver enzyme elevations; and gastrointestinal perforations. Oral Minoxidil Pregnancy And Lactation Text: This medication should only be used when clearly needed if you are pregnant, attempting to become pregnant or breast feeding. Topical Sulfur Applications Pregnancy And Lactation Text: This medication is Pregnancy Category C and has an unknown safety profile during pregnancy. It is unknown if this topical medication is excreted in breast milk. Cyclosporine Pregnancy And Lactation Text: This medication is Pregnancy Category C and it isn't know if it is safe during pregnancy. This medication is excreted in breast milk. Azelaic Acid Pregnancy And Lactation Text: This medication is considered safe during pregnancy and breast feeding. Topical Clindamycin Counseling: Patient counseled that this medication may cause skin irritation or allergic reactions.  In the event of skin irritation, the patient was advised to reduce the amount of the drug applied or use it less frequently.   The patient verbalized understanding of the proper use and possible adverse effects of clindamycin.  All of the patient's questions and concerns were addressed. Low Dose Naltrexone Counseling- I discussed with the patient the potential risks and side effects of low dose naltrexone including but not limited to: more vivid dreams, headaches, nausea, vomiting, abdominal pain, fatigue, dizziness, and anxiety. Bexarotene Pregnancy And Lactation Text: This medication is Pregnancy Category X and should not be given to women who are pregnant or may become pregnant. This medication should not be used if you are breast feeding. Picato Counseling:  I discussed with the patient the risks of Picato including but not limited to erythema, scaling, itching, weeping, crusting, and pain. Rifampin Pregnancy And Lactation Text: This medication is Pregnancy Category C and it isn't know if it is safe during pregnancy. It is also excreted in breast milk and should not be used if you are breast feeding. Spironolactone Pregnancy And Lactation Text: This medication can cause feminization of the male fetus and should be avoided during pregnancy. The active metabolite is also found in breast milk. Tranexamic Acid Pregnancy And Lactation Text: It is unknown if this medication is safe during pregnancy or breast feeding. Skyrizi Counseling: I discussed with the patient the risks of risankizumab-rzaa including but not limited to immunosuppression, and serious infections.  The patient understands that monitoring is required including a PPD at baseline and must alert us or the primary physician if symptoms of infection or other concerning signs are noted. Itraconazole Counseling:  I discussed with the patient the risks of itraconazole including but not limited to liver damage, nausea/vomiting, neuropathy, and severe allergy.  The patient understands that this medication is best absorbed when taken with acidic beverages such as non-diet cola or ginger ale.  The patient understands that monitoring is required including baseline LFTs and repeat LFTs at intervals.  The patient understands that they are to contact us or the primary physician if concerning signs are noted. Rinvoq Pregnancy And Lactation Text: Based on animal studies, Rinvoq may cause embryo-fetal harm when administered to pregnant women.  The medication should not be used in pregnancy.  Breastfeeding is not recommended during treatment and for 6 days after the last dose. Drysol Counseling:  I discussed with the patient the risks of drysol/aluminum chloride including but not limited to skin rash, itching, irritation, burning. Xolair Counseling:  Patient informed of potential adverse effects including but not limited to fever, muscle aches, rash and allergic reactions.  The patient verbalized understanding of the proper use and possible adverse effects of Xolair.  All of the patient's questions and concerns were addressed. Prednisone Counseling:  I discussed with the patient the risks of prolonged use of prednisone including but not limited to weight gain, insomnia, osteoporosis, mood changes, diabetes, susceptibility to infection, glaucoma and high blood pressure.  In cases where prednisone use is prolonged, patients should be monitored with blood pressure checks, serum glucose levels and an eye exam.  Additionally, the patient may need to be placed on GI prophylaxis, PCP prophylaxis, and calcium and vitamin D supplementation and/or a bisphosphonate.  The patient verbalized understanding of the proper use and the possible adverse effects of prednisone.  All of the patient's questions and concerns were addressed. Erythromycin Counseling:  I discussed with the patient the risks of erythromycin including but not limited to GI upset, allergic reaction, drug rash, diarrhea, increase in liver enzymes, and yeast infections. Benzoyl Peroxide Counseling: Patient counseled that medicine may cause skin irritation and bleach clothing.  In the event of skin irritation, the patient was advised to reduce the amount of the drug applied or use it less frequently.   The patient verbalized understanding of the proper use and possible adverse effects of benzoyl peroxide.  All of the patient's questions and concerns were addressed. Doxepin Pregnancy And Lactation Text: This medication is Pregnancy Category C and it isn't known if it is safe during pregnancy. It is also excreted in breast milk and breast feeding isn't recommended. Dupixent Counseling: I discussed with the patient the risks of dupilumab including but not limited to eye infection and irritation, cold sores, injection site reactions, worsening of asthma, allergic reactions and increased risk of parasitic infection.  Live vaccines should be avoided while taking dupilumab. Dupilumab will also interact with certain medications such as warfarin and cyclosporine. The patient understands that monitoring is required and they must alert us or the primary physician if symptoms of infection or other concerning signs are noted. Azithromycin Pregnancy And Lactation Text: This medication is considered safe during pregnancy and is also secreted in breast milk. Dutasteride Male Counseling: Dustasteride Counseling:  I discussed with the patient the risks of use of dutasteride including but not limited to decreased libido, decreased ejaculate volume, and gynecomastia. Women who can become pregnant should not handle medication.  All of the patient's questions and concerns were addressed. Isotretinoin Counseling: Patient should get monthly blood tests, not donate blood, not drive at night if vision affected, not share medication, and not undergo elective surgery for 6 months after tx completed. Side effects reviewed, pt to contact office should one occur. Low Dose Naltrexone Pregnancy And Lactation Text: Naltrexone is pregnancy category C.  There have been no adequate and well-controlled studies in pregnant women.  It should be used in pregnancy only if the potential benefit justifies the potential risk to the fetus.   Limited data indicates that naltrexone is minimally excreted into breastmilk. Sarecycline Counseling: Patient advised regarding possible photosensitivity and discoloration of the teeth, skin, lips, tongue and gums.  Patient instructed to avoid sunlight, if possible.  When exposed to sunlight, patients should wear protective clothing, sunglasses, and sunscreen.  The patient was instructed to call the office immediately if the following severe adverse effects occur:  hearing changes, easy bruising/bleeding, severe headache, or vision changes.  The patient verbalized understanding of the proper use and possible adverse effects of sarecycline.  All of the patient's questions and concerns were addressed. Wartpeel Counseling:  I discussed with the patient the risks of Wartpeel including but not limited to erythema, scaling, itching, weeping, crusting, and pain. Otezla Counseling: The side effects of Otezla were discussed with the patient, including but not limited to worsening or new depression, weight loss, diarrhea, nausea, upper respiratory tract infection, and headache. Patient instructed to call the office should any adverse effect occur.  The patient verbalized understanding of the proper use and possible adverse effects of Otezla.  All the patient's questions and concerns were addressed. Solaraze Counseling:  I discussed with the patient the risks of Solaraze including but not limited to erythema, scaling, itching, weeping, crusting, and pain. Dapsone Counseling: I discussed with the patient the risks of dapsone including but not limited to hemolytic anemia, agranulocytosis, rashes, methemoglobinemia, kidney failure, peripheral neuropathy, headaches, GI upset, and liver toxicity.  Patients who start dapsone require monitoring including baseline LFTs and weekly CBCs for the first month, then every month thereafter.  The patient verbalized understanding of the proper use and possible adverse effects of dapsone.  All of the patient's questions and concerns were addressed. Klisyri Counseling:  I discussed with the patient the risks of Klisyri including but not limited to erythema, scaling, itching, weeping, crusting, and pain. Valtrex Counseling: I discussed with the patient the risks of valacyclovir including but not limited to kidney damage, nausea, vomiting and severe allergy.  The patient understands that if the infection seems to be worsening or is not improving, they are to call. Erythromycin Pregnancy And Lactation Text: This medication is Pregnancy Category B and is considered safe during pregnancy. It is also excreted in breast milk. Infliximab Counseling:  I discussed with the patient the risks of infliximab including but not limited to myelosuppression, immunosuppression, autoimmune hepatitis, demyelinating diseases, lymphoma, and serious infections.  The patient understands that monitoring is required including a PPD at baseline and must alert us or the primary physician if symptoms of infection or other concerning signs are noted. Bactrim Counseling:  I discussed with the patient the risks of sulfa antibiotics including but not limited to GI upset, allergic reaction, drug rash, diarrhea, dizziness, photosensitivity, and yeast infections.  Rarely, more serious reactions can occur including but not limited to aplastic anemia, agranulocytosis, methemoglobinemia, blood dyscrasias, liver or kidney failure, lung infiltrates or desquamative/blistering drug rashes. Otezla Pregnancy And Lactation Text: This medication is Pregnancy Category C and it isn't known if it is safe during pregnancy. It is unknown if it is excreted in breast milk. Hydroxyzine Counseling: Patient advised that the medication is sedating and not to drive a car after taking this medication.  Patient informed of potential adverse effects including but not limited to dry mouth, urinary retention, and blurry vision.  The patient verbalized understanding of the proper use and possible adverse effects of hydroxyzine.  All of the patient's questions and concerns were addressed. Benzoyl Peroxide Pregnancy And Lactation Text: This medication is Pregnancy Category C. It is unknown if benzoyl peroxide is excreted in breast milk. Dutasteride Female Counseling: Dutasteride Counseling:  I discussed with the patient the risks of use of dutasteride including but not limited to decreased libido and sexual dysfunction. Explained the teratogenic nature of the medication and stressed the importance of not getting pregnant during treatment. All of the patient's questions and concerns were addressed. Sotyktu Counseling:  I discussed the most common side effects of Sotyktu including: common cold, sore throat, sinus infections, cold sores, canker sores, folliculitis, and acne.? I also discussed more serious side effects of Sotyktu including but not limited to: serious allergic reactions; increased risk for infections such as TB; cancers such as lymphomas; rhabdomyolysis and elevated CPK; and elevated triglycerides and liver enzymes.? Dupixent Pregnancy And Lactation Text: This medication likely crosses the placenta but the risk for the fetus is uncertain. This medication is excreted in breast milk. Opioid Counseling: I discussed with the patient the potential side effects of opioids including but not limited to addiction, altered mental status, and depression. I stressed avoiding alcohol, benzodiazepines, muscle relaxants and sleep aids unless specifically okayed by a physician. The patient verbalized understanding of the proper use and possible adverse effects of opioids. All of the patient's questions and concerns were addressed. They were instructed to flush the remaining pills down the toilet if they did not need them for pain. Spevigo Counseling: I discussed with the patient the risks of Spevigo including but not limited to fatigue, nasuea, vomiting, headache, pruritus, urinary tract infection, an infusion related reactions.  The patient understands that monitoring is required including screening for tuberculosis at baseline and yearly screening thereafter while continuing Spevigo therapy. They should contact us if symptoms of infection or other concerning signs are noted. Adbry Counseling: I discussed with the patient the risks of tralokinumab including but not limited to eye infection and irritation, cold sores, injection site reactions, worsening of asthma, allergic reactions and increased risk of parasitic infection.  Live vaccines should be avoided while taking tralokinumab. The patient understands that monitoring is required and they must alert us or the primary physician if symptoms of infection or other concerning signs are noted. Xolair Pregnancy And Lactation Text: This medication is Pregnancy Category B and is considered safe during pregnancy. This medication is excreted in breast milk. Niacinamide Counseling: I recommended taking niacin or niacinamide, also know as vitamin B3, twice daily. Recent evidence suggests that taking vitamin B3 (500 mg twice daily) can reduce the risk of actinic keratoses and non-melanoma skin cancers. Side effects of vitamin B3 include flushing and headache. Topical Ketoconazole Counseling: Patient counseled that this medication may cause skin irritation or allergic reactions.  In the event of skin irritation, the patient was advised to reduce the amount of the drug applied or use it less frequently.   The patient verbalized understanding of the proper use and possible adverse effects of ketoconazole.  All of the patient's questions and concerns were addressed. Valtrex Pregnancy And Lactation Text: this medication is Pregnancy Category B and is considered safe during pregnancy. This medication is not directly found in breast milk but it's metabolite acyclovir is present. Azathioprine Counseling:  I discussed with the patient the risks of azathioprine including but not limited to myelosuppression, immunosuppression, hepatotoxicity, lymphoma, and infections.  The patient understands that monitoring is required including baseline LFTs, Creatinine, possible TPMP genotyping and weekly CBCs for the first month and then every 2 weeks thereafter.  The patient verbalized understanding of the proper use and possible adverse effects of azathioprine.  All of the patient's questions and concerns were addressed. Isotretinoin Pregnancy And Lactation Text: This medication is Pregnancy Category X and is considered extremely dangerous during pregnancy. It is unknown if it is excreted in breast milk. Protopic Counseling: Patient may experience a mild burning sensation during topical application. Protopic is not approved in children less than 2 years of age. There have been case reports of hematologic and skin malignancies in patients using topical calcineurin inhibitors although causality is questionable. Solaraze Pregnancy And Lactation Text: This medication is Pregnancy Category B and is considered safe. There is some data to suggest avoiding during the third trimester. It is unknown if this medication is excreted in breast milk. Dapsone Pregnancy And Lactation Text: This medication is Pregnancy Category C and is not considered safe during pregnancy or breast feeding. Klisyri Pregnancy And Lactation Text: It is unknown if this medication can harm a developing fetus or if it is excreted in breast milk. Metronidazole Counseling:  I discussed with the patient the risks of metronidazole including but not limited to seizures, nausea/vomiting, a metallic taste in the mouth, nausea/vomiting and severe allergy. Bactrim Pregnancy And Lactation Text: This medication is Pregnancy Category D and is known to cause fetal risk.  It is also excreted in breast milk. Enbrel Counseling:  I discussed with the patient the risks of etanercept including but not limited to myelosuppression, immunosuppression, autoimmune hepatitis, demyelinating diseases, lymphoma, and infections.  The patient understands that monitoring is required including a PPD at baseline and must alert us or the primary physician if symptoms of infection or other concerning signs are noted. Oxybutynin Counseling:  I discussed with the patient the risks of oxybutynin including but not limited to skin rash, drowsiness, dry mouth, difficulty urinating, and blurred vision. Ketoconazole Counseling:   Patient counseled regarding improving absorption with orange juice.  Adverse effects include but are not limited to breast enlargement, headache, diarrhea, nausea, upset stomach, liver function test abnormalities, taste disturbance, and stomach pain.  There is a rare possibility of liver failure that can occur when taking ketoconazole. The patient understands that monitoring of LFTs may be required, especially at baseline. The patient verbalized understanding of the proper use and possible adverse effects of ketoconazole.  All of the patient's questions and concerns were addressed. Dutasteride Pregnancy And Lactation Text: This medication is absolutely contraindicated in women, especially during pregnancy and breast feeding. Feminization of male fetuses is possible if taking while pregnant. Winlevi Counseling:  I discussed with the patient the risks of topical clascoterone including but not limited to erythema, scaling, itching, and stinging. Patient voiced their understanding. Sotyktu Pregnancy And Lactation Text: There is insufficient data to evaluate whether or not Sotyktu is safe to use during pregnancy.? ?It is not known if Sotyktu passes into breast milk and whether or not it is safe to use when breastfeeding.?? Elidel Counseling: Patient may experience a mild burning sensation during topical application. Elidel is not approved in children less than 2 years of age. There have been case reports of hematologic and skin malignancies in patients using topical calcineurin inhibitors although causality is questionable. Cibinqo Counseling: I discussed with the patient the risks of Cibinqo therapy including but not limited to common cold, nausea, headache, cold sores, increased blood CPK levels, dizziness, UTIs, fatigue, acne, and vomitting. Live vaccines should be avoided.  This medication has been linked to serious infections; higher rate of mortality; malignancy and lymphoproliferative disorders; major adverse cardiovascular events; thrombosis; thrombocytopenia and lymphopenia; lipid elevations; and retinal detachment. Carac Counseling:  I discussed with the patient the risks of Carac including but not limited to erythema, scaling, itching, weeping, crusting, and pain. Hydroxyzine Pregnancy And Lactation Text: This medication is not safe during pregnancy and should not be taken. It is also excreted in breast milk and breast feeding isn't recommended. Opioid Pregnancy And Lactation Text: These medications can lead to premature delivery and should be avoided during pregnancy. These medications are also present in breast milk in small amounts. High Dose Vitamin A Counseling: Side effects reviewed, pt to contact office should one occur. Protopic Pregnancy And Lactation Text: This medication is Pregnancy Category C. It is unknown if this medication is excreted in breast milk when applied topically. Adbry Pregnancy And Lactation Text: It is unknown if this medication will adversely affect pregnancy or breast feeding. Tetracycline Counseling: Patient counseled regarding possible photosensitivity and increased risk for sunburn.  Patient instructed to avoid sunlight, if possible.  When exposed to sunlight, patients should wear protective clothing, sunglasses, and sunscreen.  The patient was instructed to call the office immediately if the following severe adverse effects occur:  hearing changes, easy bruising/bleeding, severe headache, or vision changes.  The patient verbalized understanding of the proper use and possible adverse effects of tetracycline.  All of the patient's questions and concerns were addressed. Patient understands to avoid pregnancy while on therapy due to potential birth defects. Soolantra Counseling: I discussed with the patients the risks of topial Soolantra. This is a medicine which decreases the number of mites and inflammation in the skin. You experience burning, stinging, eye irritation or allergic reactions.  Please call our office if you develop any problems from using this medication. Gabapentin Counseling: I discussed with the patient the risks of gabapentin including but not limited to dizziness, somnolence, fatigue and ataxia. Spevigo Pregnancy And Lactation Text: The risk during pregnancy and breastfeeding is uncertain with this medication. This medication does cross the placenta. It is unknown if this medication is found in breast milk. Metronidazole Pregnancy And Lactation Text: This medication is Pregnancy Category B and considered safe during pregnancy.  It is also excreted in breast milk. Niacinamide Pregnancy And Lactation Text: These medications are considered safe during pregnancy. Ketoconazole Pregnancy And Lactation Text: This medication is Pregnancy Category C and it isn't know if it is safe during pregnancy. It is also excreted in breast milk and breast feeding isn't recommended. Minoxidil Counseling: Minoxidil is a topical medication which can increase blood flow where it is applied. It is uncertain how this medication increases hair growth. Side effects are uncommon and include stinging and allergic reactions. Erivedge Counseling- I discussed with the patient the risks of Erivedge including but not limited to nausea, vomiting, diarrhea, constipation, weight loss, changes in the sense of taste, decreased appetite, muscle spasms, and hair loss.  The patient verbalized understanding of the proper use and possible adverse effects of Erivedge.  All of the patient's questions and concerns were addressed. Rituxan Counseling:  I discussed with the patient the risks of Rituxan infusions. Side effects can include infusion reactions, severe drug rashes including mucocutaneous reactions, reactivation of latent hepatitis and other infections and rarely progressive multifocal leukoencephalopathy.  All of the patient's questions and concerns were addressed.

## (undated) DEVICE — KIT SURGICAL COLON .25 1.1OZ

## (undated) DEVICE — TUBING O2 FEMALE CONN 13FT

## (undated) DEVICE — COLLECTION SPECIMEN NEPTUNE

## (undated) DEVICE — KIT CANIST SUCTION 1200CC

## (undated) DEVICE — TIP SUCTION YANKAUER

## (undated) DEVICE — BLOCK BLOX BITE DENT RIM 54FR

## (undated) DEVICE — SOL IRRI STRL WATER 1000ML

## (undated) DEVICE — UNDERPAD DISPOSABLE 30X30IN

## (undated) DEVICE — PROBE CIRCLER FIRE APC

## (undated) DEVICE — ADAPTER DUAL NSL LUER M-M 7FT